# Patient Record
Sex: FEMALE | Race: OTHER | HISPANIC OR LATINO
[De-identification: names, ages, dates, MRNs, and addresses within clinical notes are randomized per-mention and may not be internally consistent; named-entity substitution may affect disease eponyms.]

---

## 2017-01-09 PROBLEM — Z00.00 ENCOUNTER FOR PREVENTIVE HEALTH EXAMINATION: Status: ACTIVE | Noted: 2017-01-09

## 2017-01-16 ENCOUNTER — APPOINTMENT (OUTPATIENT)
Dept: ANTEPARTUM | Facility: CLINIC | Age: 32
End: 2017-01-16

## 2017-03-21 ENCOUNTER — ASOB RESULT (OUTPATIENT)
Age: 32
End: 2017-03-21

## 2017-03-21 ENCOUNTER — APPOINTMENT (OUTPATIENT)
Dept: ANTEPARTUM | Facility: CLINIC | Age: 32
End: 2017-03-21

## 2017-03-21 ENCOUNTER — APPOINTMENT (OUTPATIENT)
Dept: MATERNAL FETAL MEDICINE | Facility: CLINIC | Age: 32
End: 2017-03-21

## 2017-03-21 VITALS
WEIGHT: 175 LBS | BODY MASS INDEX: 29.16 KG/M2 | HEIGHT: 65 IN | DIASTOLIC BLOOD PRESSURE: 60 MMHG | SYSTOLIC BLOOD PRESSURE: 100 MMHG

## 2017-03-21 DIAGNOSIS — Z20.828 OTHER SPECIFIED DISEASES AND CONDITIONS COMPLICATING PREGNANCY, CHILDBIRTH AND THE PUERPERIUM: ICD-10-CM

## 2017-03-21 DIAGNOSIS — Z3A.29 29 WEEKS GESTATION OF PREGNANCY: ICD-10-CM

## 2017-03-21 DIAGNOSIS — O40.3XX0 POLYHYDRAMNIOS, THIRD TRIMESTER, NOT APPLICABLE OR UNSPECIFIED: ICD-10-CM

## 2017-03-21 DIAGNOSIS — O09.893 SUPERVISION OF OTHER HIGH RISK PREGNANCIES, THIRD TRIMESTER: ICD-10-CM

## 2017-03-21 DIAGNOSIS — O99.89 OTHER SPECIFIED DISEASES AND CONDITIONS COMPLICATING PREGNANCY, CHILDBIRTH AND THE PUERPERIUM: ICD-10-CM

## 2017-03-21 RX ORDER — VITAMIN C, CALCIUM, IRON, VITAMIN D3, VITAMIN E, VITAMIN B1, VITAMIN B2, VITAMIN B3, VITAMIN B6, FOLIC ACID, IODINE, ZINC, COPPER, DOCUSATE SODIUM, DOCOSAHEXAENOIC ACID (DHA) 27-1-50 MG
27-1 & 250 KIT ORAL
Refills: 0 | Status: ACTIVE | COMMUNITY

## 2017-04-04 ENCOUNTER — ASOB RESULT (OUTPATIENT)
Age: 32
End: 2017-04-04

## 2017-04-04 ENCOUNTER — APPOINTMENT (OUTPATIENT)
Dept: ANTEPARTUM | Facility: CLINIC | Age: 32
End: 2017-04-04

## 2017-04-17 ENCOUNTER — ASOB RESULT (OUTPATIENT)
Age: 32
End: 2017-04-17

## 2017-04-17 ENCOUNTER — APPOINTMENT (OUTPATIENT)
Dept: ANTEPARTUM | Facility: CLINIC | Age: 32
End: 2017-04-17

## 2017-05-08 ENCOUNTER — ASOB RESULT (OUTPATIENT)
Age: 32
End: 2017-05-08

## 2017-05-08 ENCOUNTER — APPOINTMENT (OUTPATIENT)
Dept: ANTEPARTUM | Facility: CLINIC | Age: 32
End: 2017-05-08

## 2017-06-09 ENCOUNTER — ASOB RESULT (OUTPATIENT)
Age: 32
End: 2017-06-09

## 2017-06-09 ENCOUNTER — APPOINTMENT (OUTPATIENT)
Dept: ANTEPARTUM | Facility: CLINIC | Age: 32
End: 2017-06-09

## 2017-06-13 ENCOUNTER — INPATIENT (INPATIENT)
Facility: HOSPITAL | Age: 32
LOS: 2 days | Discharge: HOME | End: 2017-06-16
Attending: OBSTETRICS & GYNECOLOGY | Admitting: OBSTETRICS & GYNECOLOGY
Payer: COMMERCIAL

## 2017-06-13 VITALS — HEIGHT: 65 IN | WEIGHT: 180.78 LBS

## 2017-06-13 DIAGNOSIS — O26.893 OTHER SPECIFIED PREGNANCY RELATED CONDITIONS, THIRD TRIMESTER: ICD-10-CM

## 2017-06-13 LAB
BLD GP AB SCN SERPL QL: SIGNIFICANT CHANGE UP
EOSINOPHIL # BLD AUTO: 0.1 K/UL — SIGNIFICANT CHANGE UP (ref 0–0.5)
EOSINOPHIL NFR BLD AUTO: 1.6 % — SIGNIFICANT CHANGE UP (ref 0–6)
HCT VFR BLD CALC: 35.1 % — LOW (ref 37–47)
HGB BLD-MCNC: 11.8 G/DL — LOW (ref 12–16)
LYMPHOCYTES # BLD AUTO: 1.6 K/UL — SIGNIFICANT CHANGE UP (ref 1–4.8)
LYMPHOCYTES # BLD AUTO: 28.4 % — SIGNIFICANT CHANGE UP (ref 20–55)
MCHC RBC-ENTMCNC: 28 PG — SIGNIFICANT CHANGE UP (ref 27–31)
MCHC RBC-ENTMCNC: 33.6 G/DL — SIGNIFICANT CHANGE UP (ref 32–36)
MCV RBC AUTO: 83.2 FL — SIGNIFICANT CHANGE UP (ref 81–99)
MONOCYTES # BLD AUTO: 0.4 K/UL — SIGNIFICANT CHANGE UP (ref 0–0.8)
MONOCYTES NFR BLD AUTO: 7.5 % — SIGNIFICANT CHANGE UP (ref 3–10)
NEUTROPHILS # BLD AUTO: 3.6 K/UL — SIGNIFICANT CHANGE UP (ref 1.8–8)
NEUTROPHILS NFR BLD AUTO: 62 % — SIGNIFICANT CHANGE UP (ref 37–73)
PLATELET # BLD AUTO: 164 K/UL — SIGNIFICANT CHANGE UP (ref 150–400)
RBC # BLD: 4.22 M/UL — LOW (ref 4.4–5.2)
RBC # FLD: 14.9 % — SIGNIFICANT CHANGE UP (ref 11–15.6)
RPR SERPL-ACNC: SIGNIFICANT CHANGE UP
TYPE + AB SCN PNL BLD: SIGNIFICANT CHANGE UP
WBC # BLD: 5.7 K/UL — SIGNIFICANT CHANGE UP (ref 4.8–10.8)
WBC # FLD AUTO: 5.7 K/UL — SIGNIFICANT CHANGE UP (ref 4.8–10.8)

## 2017-06-13 RX ORDER — SODIUM CHLORIDE 9 MG/ML
1000 INJECTION, SOLUTION INTRAVENOUS
Qty: 0 | Refills: 0 | Status: DISCONTINUED | OUTPATIENT
Start: 2017-06-13 | End: 2017-06-14

## 2017-06-13 RX ORDER — PENICILLIN G POTASSIUM 5000000 [IU]/1
5 POWDER, FOR SOLUTION INTRAMUSCULAR; INTRAPLEURAL; INTRATHECAL; INTRAVENOUS ONCE
Qty: 0 | Refills: 0 | Status: COMPLETED | OUTPATIENT
Start: 2017-06-13 | End: 2017-06-13

## 2017-06-13 RX ORDER — OXYTOCIN 10 UNIT/ML
2 VIAL (ML) INJECTION
Qty: 30 | Refills: 0 | Status: DISCONTINUED | OUTPATIENT
Start: 2017-06-13 | End: 2017-06-16

## 2017-06-13 RX ORDER — PENICILLIN G POTASSIUM 5000000 [IU]/1
2.5 POWDER, FOR SOLUTION INTRAMUSCULAR; INTRAPLEURAL; INTRATHECAL; INTRAVENOUS EVERY 4 HOURS
Qty: 0 | Refills: 0 | Status: DISCONTINUED | OUTPATIENT
Start: 2017-06-13 | End: 2017-06-14

## 2017-06-13 RX ORDER — CITRIC ACID/SODIUM CITRATE 300-500 MG
15 SOLUTION, ORAL ORAL EVERY 4 HOURS
Qty: 0 | Refills: 0 | Status: DISCONTINUED | OUTPATIENT
Start: 2017-06-13 | End: 2017-06-13

## 2017-06-13 RX ORDER — PENICILLIN G POTASSIUM 5000000 [IU]/1
POWDER, FOR SOLUTION INTRAMUSCULAR; INTRAPLEURAL; INTRATHECAL; INTRAVENOUS
Qty: 0 | Refills: 0 | Status: DISCONTINUED | OUTPATIENT
Start: 2017-06-13 | End: 2017-06-14

## 2017-06-13 RX ORDER — CITRIC ACID/SODIUM CITRATE 300-500 MG
30 SOLUTION, ORAL ORAL ONCE
Qty: 0 | Refills: 0 | Status: DISCONTINUED | OUTPATIENT
Start: 2017-06-13 | End: 2017-06-16

## 2017-06-13 RX ORDER — SODIUM CHLORIDE 9 MG/ML
1000 INJECTION, SOLUTION INTRAVENOUS ONCE
Qty: 0 | Refills: 0 | Status: COMPLETED | OUTPATIENT
Start: 2017-06-13 | End: 2017-06-13

## 2017-06-13 RX ORDER — OXYTOCIN 10 UNIT/ML
333.33 VIAL (ML) INJECTION
Qty: 20 | Refills: 0 | Status: DISCONTINUED | OUTPATIENT
Start: 2017-06-13 | End: 2017-06-14

## 2017-06-13 RX ADMIN — Medication 2 MILLIUNIT(S)/MIN: at 20:56

## 2017-06-13 RX ADMIN — PENICILLIN G POTASSIUM 200 MILLION UNIT(S): 5000000 POWDER, FOR SOLUTION INTRAMUSCULAR; INTRAPLEURAL; INTRATHECAL; INTRAVENOUS at 09:30

## 2017-06-13 RX ADMIN — SODIUM CHLORIDE 2000 MILLILITER(S): 9 INJECTION, SOLUTION INTRAVENOUS at 09:31

## 2017-06-13 RX ADMIN — PENICILLIN G POTASSIUM 200 MILLION UNIT(S): 5000000 POWDER, FOR SOLUTION INTRAMUSCULAR; INTRAPLEURAL; INTRATHECAL; INTRAVENOUS at 21:54

## 2017-06-13 RX ADMIN — PENICILLIN G POTASSIUM 200 MILLION UNIT(S): 5000000 POWDER, FOR SOLUTION INTRAMUSCULAR; INTRAPLEURAL; INTRATHECAL; INTRAVENOUS at 18:03

## 2017-06-13 RX ADMIN — SODIUM CHLORIDE 125 MILLILITER(S): 9 INJECTION, SOLUTION INTRAVENOUS at 09:31

## 2017-06-13 RX ADMIN — PENICILLIN G POTASSIUM 200 MILLION UNIT(S): 5000000 POWDER, FOR SOLUTION INTRAMUSCULAR; INTRAPLEURAL; INTRATHECAL; INTRAVENOUS at 13:29

## 2017-06-14 LAB
BASE EXCESS BLDCOA CALC-SCNC: -9.4 MMOL/L — SIGNIFICANT CHANGE UP (ref -11.6–0.4)
BASE EXCESS BLDCOV CALC-SCNC: -7.5 MMOL/L — SIGNIFICANT CHANGE UP (ref -9.3–0.3)
BASOPHILS # BLD AUTO: 0 K/UL — SIGNIFICANT CHANGE UP (ref 0–0.2)
BASOPHILS NFR BLD AUTO: 0.1 % — SIGNIFICANT CHANGE UP (ref 0–2)
GAS PNL BLDCOV: 7.28 — SIGNIFICANT CHANGE UP (ref 7.11–7.36)
HCO3 BLDCOA-SCNC: 18 MMOL/L — SIGNIFICANT CHANGE UP (ref 15–27)
HCO3 BLDCOV-SCNC: 19 MMOL/L — SIGNIFICANT CHANGE UP (ref 17–25)
HCT VFR BLD CALC: 32.2 % — LOW (ref 37–47)
HGB BLD-MCNC: 11 G/DL — LOW (ref 12–16)
LYMPHOCYTES # BLD AUTO: 0.8 K/UL — LOW (ref 1–4.8)
LYMPHOCYTES # BLD AUTO: 5 % — LOW (ref 20–55)
MCHC RBC-ENTMCNC: 27.8 PG — SIGNIFICANT CHANGE UP (ref 27–31)
MCHC RBC-ENTMCNC: 34.2 G/DL — SIGNIFICANT CHANGE UP (ref 32–36)
MCV RBC AUTO: 81.5 FL — SIGNIFICANT CHANGE UP (ref 81–99)
MONOCYTES # BLD AUTO: 0.6 K/UL — SIGNIFICANT CHANGE UP (ref 0–0.8)
MONOCYTES NFR BLD AUTO: 3.5 % — SIGNIFICANT CHANGE UP (ref 3–10)
NEUTROPHILS # BLD AUTO: 14.4 K/UL — HIGH (ref 1.8–8)
NEUTROPHILS NFR BLD AUTO: 91.1 % — HIGH (ref 37–73)
PCO2 BLDCOA: 47.8 MMHG — SIGNIFICANT CHANGE UP (ref 32.2–65.8)
PCO2 BLDCOV: 40.9 MMHG — SIGNIFICANT CHANGE UP (ref 27–49.4)
PH BLDCOA: 7.21 — SIGNIFICANT CHANGE UP (ref 7.11–7.36)
PLATELET # BLD AUTO: 166 K/UL — SIGNIFICANT CHANGE UP (ref 150–400)
PO2 BLDCOA: 26 MMHG — SIGNIFICANT CHANGE UP (ref 6–30)
PO2 BLDCOA: 29.3 MMHG — SIGNIFICANT CHANGE UP (ref 17.4–41)
RBC # BLD: 3.95 M/UL — LOW (ref 4.4–5.2)
RBC # FLD: 14.9 % — SIGNIFICANT CHANGE UP (ref 11–15.6)
SAO2 % BLDCOA: SIGNIFICANT CHANGE UP
SAO2 % BLDCOV: SIGNIFICANT CHANGE UP
WBC # BLD: 15.8 K/UL — HIGH (ref 4.8–10.8)
WBC # FLD AUTO: 15.8 K/UL — HIGH (ref 4.8–10.8)

## 2017-06-14 RX ORDER — OXYTOCIN 10 UNIT/ML
10 VIAL (ML) INJECTION ONCE
Qty: 0 | Refills: 0 | Status: COMPLETED | OUTPATIENT
Start: 2017-06-14 | End: 2017-06-14

## 2017-06-14 RX ORDER — OXYTOCIN 10 UNIT/ML
41.67 VIAL (ML) INJECTION
Qty: 20 | Refills: 0 | Status: DISCONTINUED | OUTPATIENT
Start: 2017-06-14 | End: 2017-06-16

## 2017-06-14 RX ORDER — AER TRAVELER 0.5 G/1
1 SOLUTION RECTAL; TOPICAL EVERY 4 HOURS
Qty: 0 | Refills: 0 | Status: DISCONTINUED | OUTPATIENT
Start: 2017-06-14 | End: 2017-06-16

## 2017-06-14 RX ORDER — ACETAMINOPHEN 500 MG
650 TABLET ORAL EVERY 6 HOURS
Qty: 0 | Refills: 0 | Status: DISCONTINUED | OUTPATIENT
Start: 2017-06-14 | End: 2017-06-16

## 2017-06-14 RX ORDER — TETANUS TOXOID, REDUCED DIPHTHERIA TOXOID AND ACELLULAR PERTUSSIS VACCINE, ADSORBED 5; 2.5; 8; 8; 2.5 [IU]/.5ML; [IU]/.5ML; UG/.5ML; UG/.5ML; UG/.5ML
0.5 SUSPENSION INTRAMUSCULAR ONCE
Qty: 0 | Refills: 0 | Status: DISCONTINUED | OUTPATIENT
Start: 2017-06-14 | End: 2017-06-16

## 2017-06-14 RX ORDER — DIPHENHYDRAMINE HCL 50 MG
25 CAPSULE ORAL EVERY 6 HOURS
Qty: 0 | Refills: 0 | Status: DISCONTINUED | OUTPATIENT
Start: 2017-06-14 | End: 2017-06-16

## 2017-06-14 RX ORDER — GLYCERIN ADULT
1 SUPPOSITORY, RECTAL RECTAL AT BEDTIME
Qty: 0 | Refills: 0 | Status: DISCONTINUED | OUTPATIENT
Start: 2017-06-14 | End: 2017-06-16

## 2017-06-14 RX ORDER — DIBUCAINE 1 %
1 OINTMENT (GRAM) RECTAL EVERY 4 HOURS
Qty: 0 | Refills: 0 | Status: DISCONTINUED | OUTPATIENT
Start: 2017-06-14 | End: 2017-06-16

## 2017-06-14 RX ORDER — DOCUSATE SODIUM 100 MG
100 CAPSULE ORAL
Qty: 0 | Refills: 0 | Status: DISCONTINUED | OUTPATIENT
Start: 2017-06-14 | End: 2017-06-16

## 2017-06-14 RX ORDER — HYDROCORTISONE 1 %
1 OINTMENT (GRAM) TOPICAL EVERY 4 HOURS
Qty: 0 | Refills: 0 | Status: DISCONTINUED | OUTPATIENT
Start: 2017-06-14 | End: 2017-06-16

## 2017-06-14 RX ORDER — SIMETHICONE 80 MG/1
80 TABLET, CHEWABLE ORAL EVERY 6 HOURS
Qty: 0 | Refills: 0 | Status: DISCONTINUED | OUTPATIENT
Start: 2017-06-14 | End: 2017-06-16

## 2017-06-14 RX ORDER — LANOLIN
1 OINTMENT (GRAM) TOPICAL EVERY 6 HOURS
Qty: 0 | Refills: 0 | Status: DISCONTINUED | OUTPATIENT
Start: 2017-06-14 | End: 2017-06-16

## 2017-06-14 RX ORDER — IBUPROFEN 200 MG
600 TABLET ORAL EVERY 6 HOURS
Qty: 0 | Refills: 0 | Status: DISCONTINUED | OUTPATIENT
Start: 2017-06-14 | End: 2017-06-16

## 2017-06-14 RX ORDER — PRAMOXINE HYDROCHLORIDE 150 MG/15G
1 AEROSOL, FOAM RECTAL EVERY 4 HOURS
Qty: 0 | Refills: 0 | Status: DISCONTINUED | OUTPATIENT
Start: 2017-06-14 | End: 2017-06-16

## 2017-06-14 RX ORDER — SODIUM CHLORIDE 9 MG/ML
3 INJECTION INTRAMUSCULAR; INTRAVENOUS; SUBCUTANEOUS EVERY 8 HOURS
Qty: 0 | Refills: 0 | Status: DISCONTINUED | OUTPATIENT
Start: 2017-06-14 | End: 2017-06-16

## 2017-06-14 RX ORDER — OXYTOCIN 10 UNIT/ML
125 VIAL (ML) INJECTION
Qty: 20 | Refills: 0 | Status: DISCONTINUED | OUTPATIENT
Start: 2017-06-14 | End: 2017-06-14

## 2017-06-14 RX ORDER — BUTORPHANOL TARTRATE 2 MG/ML
2 INJECTION, SOLUTION INTRAMUSCULAR; INTRAVENOUS ONCE
Qty: 0 | Refills: 0 | Status: DISCONTINUED | OUTPATIENT
Start: 2017-06-14 | End: 2017-06-14

## 2017-06-14 RX ORDER — MAGNESIUM HYDROXIDE 400 MG/1
30 TABLET, CHEWABLE ORAL
Qty: 0 | Refills: 0 | Status: DISCONTINUED | OUTPATIENT
Start: 2017-06-14 | End: 2017-06-16

## 2017-06-14 RX ORDER — OXYTOCIN 10 UNIT/ML
41.67 VIAL (ML) INJECTION
Qty: 20 | Refills: 0 | Status: DISCONTINUED | OUTPATIENT
Start: 2017-06-14 | End: 2017-06-14

## 2017-06-14 RX ORDER — SODIUM CHLORIDE 9 MG/ML
1000 INJECTION, SOLUTION INTRAVENOUS ONCE
Qty: 0 | Refills: 0 | Status: COMPLETED | OUTPATIENT
Start: 2017-06-14 | End: 2017-06-14

## 2017-06-14 RX ADMIN — SODIUM CHLORIDE 3 MILLILITER(S): 9 INJECTION INTRAMUSCULAR; INTRAVENOUS; SUBCUTANEOUS at 22:30

## 2017-06-14 RX ADMIN — Medication 202 MILLIGRAM(S): at 03:30

## 2017-06-14 RX ADMIN — PENICILLIN G POTASSIUM 200 MILLION UNIT(S): 5000000 POWDER, FOR SOLUTION INTRAMUSCULAR; INTRAPLEURAL; INTRATHECAL; INTRAVENOUS at 02:00

## 2017-06-14 RX ADMIN — PENICILLIN G POTASSIUM 200 MILLION UNIT(S): 5000000 POWDER, FOR SOLUTION INTRAMUSCULAR; INTRAPLEURAL; INTRATHECAL; INTRAVENOUS at 05:46

## 2017-06-14 RX ADMIN — Medication 125 MILLIUNIT(S)/MIN: at 10:45

## 2017-06-14 RX ADMIN — Medication 0.2 MILLIGRAM(S): at 07:41

## 2017-06-14 RX ADMIN — SODIUM CHLORIDE 2000 MILLILITER(S): 9 INJECTION, SOLUTION INTRAVENOUS at 09:20

## 2017-06-14 RX ADMIN — Medication 1 TABLET(S): at 11:53

## 2017-06-14 RX ADMIN — BUTORPHANOL TARTRATE 2 MILLIGRAM(S): 2 INJECTION, SOLUTION INTRAMUSCULAR; INTRAVENOUS at 03:51

## 2017-06-14 RX ADMIN — Medication 10 UNIT(S): at 09:14

## 2017-06-14 RX ADMIN — Medication 125 MILLIUNIT(S)/MIN: at 08:22

## 2017-06-14 RX ADMIN — BUTORPHANOL TARTRATE 2 MILLIGRAM(S): 2 INJECTION, SOLUTION INTRAMUSCULAR; INTRAVENOUS at 03:36

## 2017-06-15 RX ADMIN — SODIUM CHLORIDE 3 MILLILITER(S): 9 INJECTION INTRAMUSCULAR; INTRAVENOUS; SUBCUTANEOUS at 06:52

## 2017-06-15 RX ADMIN — Medication 1 TABLET(S): at 12:32

## 2017-06-15 RX ADMIN — Medication 600 MILLIGRAM(S): at 20:28

## 2017-06-15 RX ADMIN — Medication 600 MILLIGRAM(S): at 21:15

## 2017-06-15 RX ADMIN — Medication 600 MILLIGRAM(S): at 12:00

## 2017-06-15 RX ADMIN — Medication 600 MILLIGRAM(S): at 11:01

## 2017-06-15 NOTE — PROGRESS NOTE ADULT - SUBJECTIVE AND OBJECTIVE BOX
This is a 32 yo now  s/p  @ 40.1 PPD #1. Pt seen and examined at bedside. Pt is ambulating, tolerating PO, + flatus, No BM yet. Pt is bottle-feeding. Pain is controlled on medications, having minimal vaginal bleeding.    Vital Signs Last 24 Hrs  T(C): 37.2, Max: 37.6 (06-14 @ 13:50)  T(F): 99, Max: 99.7 (06-14 @ 13:50)  HR: 76 (73 - 97)  BP: 103/65 (92/50 - 129/86)  BP(mean): 102 (64 - 102)  RR: 18 (16 - 18)  SpO2: 100% (99% - 100%)    General: NAD  HEENT: NCAT  Respiratory: CTABL  CV: S1S2 with RRR  Abdominal: Soft, +BS x 4 quadrants, uterine fundus firm 1 cm below umbilicus   Pelvic:  minimal lochia  Extremities: No edema b/l; no calf tenderness                          11.0   15.8  )-----------( 166      ( 2017 13:06 )             32.2     MEDICATIONS  (STANDING):  citric acid/sodium citrate Solution 30milliLiter(s) Oral once  oxytocin Infusion 2milliUNIT(s)/Min IV Continuous <Continuous>  sodium chloride 0.9% lock flush 3milliLiter(s) IV Push every 8 hours  diphtheria/tetanus/pertussis (acellular) Vaccine (ADAcel) 0.5milliLiter(s) IntraMuscular once  prenatal multivitamin 1Tablet(s) Oral daily  sodium chloride 0.9% lock flush 3milliLiter(s) IV Push every 8 hours  oxytocin Infusion 41.667milliUNIT(s)/Min IV Continuous <Continuous>    MEDICATIONS  (PRN):  acetaminophen   Tablet. 650milliGRAM(s) Oral every 6 hours PRN Mild Pain  acetaminophen   Tablet 650milliGRAM(s) Oral every 6 hours PRN For Temp greater than 38.5 C (101.3 F)  ibuprofen  Tablet 600milliGRAM(s) Oral every 6 hours PRN Moderate Pain  oxyCODONE  5 mG/acetaminophen 325 mG 2Tablet(s) Oral every 6 hours PRN Severe Pain  hydrocortisone 1% Cream 1Application(s) Topical every 4 hours PRN Moderate to Severe Perineal Pain  pramoxine 1%/zinc 5% Cream 1Application(s) Topical every 4 hours PRN Moderate to Severe Perineal Pain  dibucaine 1% Ointment 1Application(s) Topical every 4 hours PRN Perineal Discomfort  lanolin Ointment 1Application(s) Topical every 6 hours PRN Sore Nipples  witch hazel Pads 1Application(s) Topical every 4 hours PRN Perineal Discomfort  simethicone 80milliGRAM(s) Chew every 6 hours PRN Gas  diphenhydrAMINE   Capsule 25milliGRAM(s) Oral every 6 hours PRN Itching  glycerin Suppository - Adult 1Suppository(s) Rectal at bedtime PRN Constipation  magnesium hydroxide Suspension 30milliLiter(s) Oral two times a day PRN Constipation  docusate sodium 100milliGRAM(s) Oral two times a day PRN Stool Softening

## 2017-06-16 VITALS
DIASTOLIC BLOOD PRESSURE: 76 MMHG | TEMPERATURE: 98 F | RESPIRATION RATE: 18 BRPM | HEART RATE: 100 BPM | SYSTOLIC BLOOD PRESSURE: 120 MMHG

## 2017-06-16 RX ADMIN — Medication 600 MILLIGRAM(S): at 09:35

## 2017-06-16 RX ADMIN — Medication 1 TABLET(S): at 12:54

## 2017-06-16 NOTE — PROGRESS NOTE ADULT - SUBJECTIVE AND OBJECTIVE BOX
This is a 30 yo now  s/p  @ 40.1 PPD #2. Pt seen and examined at bedside. Pt is ambulating, tolerating PO, + flatus, +BMx1. Pt is bottle-feeding. Pain is controlled on medications, having minimal vaginal bleeding.    Vital Signs Last 24 Hrs  T(C): 36.6, Max: 36.7 (06-15 @ 08:04)  T(F): 97.9, Max: 98 (06-15 @ 08:04)  HR: 90 (72 - 90)  BP: 115/65 (109/77 - 115/65)  BP(mean): --  RR: 18 (18 - 18)  SpO2: --RA      General: NAD  HEENT: NCAT  Respiratory: CTABL  CV: S1S2 with RRR  Abdominal: Soft, +BS x 4 quadrants, uterine fundus firm 1 cm below umbilicus   Pelvic:  minimal lochia  Extremities: No edema b/l; no calf tenderness                          11.0   15.8  )-----------( 166      ( 2017 13:06 )             32.2     MEDICATIONS  (STANDING):  citric acid/sodium citrate Solution 30milliLiter(s) Oral once  oxytocin Infusion 2milliUNIT(s)/Min IV Continuous <Continuous>  sodium chloride 0.9% lock flush 3milliLiter(s) IV Push every 8 hours  diphtheria/tetanus/pertussis (acellular) Vaccine (ADAcel) 0.5milliLiter(s) IntraMuscular once  prenatal multivitamin 1Tablet(s) Oral daily  sodium chloride 0.9% lock flush 3milliLiter(s) IV Push every 8 hours  oxytocin Infusion 41.667milliUNIT(s)/Min IV Continuous <Continuous>    MEDICATIONS  (PRN):  acetaminophen   Tablet. 650milliGRAM(s) Oral every 6 hours PRN Mild Pain  acetaminophen   Tablet 650milliGRAM(s) Oral every 6 hours PRN For Temp greater than 38.5 C (101.3 F)  ibuprofen  Tablet 600milliGRAM(s) Oral every 6 hours PRN Moderate Pain  oxyCODONE  5 mG/acetaminophen 325 mG 2Tablet(s) Oral every 6 hours PRN Severe Pain  hydrocortisone 1% Cream 1Application(s) Topical every 4 hours PRN Moderate to Severe Perineal Pain  pramoxine 1%/zinc 5% Cream 1Application(s) Topical every 4 hours PRN Moderate to Severe Perineal Pain  dibucaine 1% Ointment 1Application(s) Topical every 4 hours PRN Perineal Discomfort  lanolin Ointment 1Application(s) Topical every 6 hours PRN Sore Nipples  witch hazel Pads 1Application(s) Topical every 4 hours PRN Perineal Discomfort  simethicone 80milliGRAM(s) Chew every 6 hours PRN Gas  diphenhydrAMINE   Capsule 25milliGRAM(s) Oral every 6 hours PRN Itching  glycerin Suppository - Adult 1Suppository(s) Rectal at bedtime PRN Constipation  magnesium hydroxide Suspension 30milliLiter(s) Oral two times a day PRN Constipation  docusate sodium 100milliGRAM(s) Oral two times a day PRN Stool Softening

## 2017-06-16 NOTE — DISCHARGE NOTE OB - CARE PROVIDER_API CALL
[facility],   [facility]  [address] [place] [address]  Phone: [phone]  Fax: (   )    -

## 2017-06-16 NOTE — DISCHARGE NOTE OB - PLAN OF CARE
delivered Continue with diet and activity as tolerated, medications as prescribed. Follow up at [facility] in 6 weeks for postpartum care.

## 2017-06-16 NOTE — DISCHARGE NOTE OB - CARE PLAN
Principal Discharge DX:	Vaginal delivery  Goal:	delivered  Instructions for follow-up, activity and diet:	Continue with diet and activity as tolerated, medications as prescribed. Follow up at [facility] in 6 weeks for postpartum care. Principal Discharge DX:	Vaginal delivery  Goal:	delivered  Instructions for follow-up, activity and diet:	Continue with diet and activity as tolerated, medications as prescribed. Follow up at [facility] in 6 weeks for postpartum care.

## 2017-06-16 NOTE — DISCHARGE NOTE OB - HOSPITAL COURSE
This is a 30 yo now  who experienced  @ 40.1. Labor course was uncomplicated, delivery was uncomplicated. Postpartum course was unremarkable. Patient was transferred to postpartum floor and monitored. Patient is medically optimized for discharge, instructed to follow up with [facility] in 6 weeks for postpartum care.

## 2017-06-16 NOTE — DISCHARGE NOTE OB - PROVIDER TOKENS
FREE:[LAST:[[facility]],PHONE:[phone]],FAX:[(   )    -],ADDRESS:[[place] [address]]]

## 2017-06-16 NOTE — PROGRESS NOTE ADULT - PROBLEM SELECTOR PLAN 1
Continue current management  Encourage PO intake and hydration  Encourage mother/baby interaction and breastfeeding  Encourage ambulation  Plan for dc today pending attending eval
Continue current management  Encourage PO intake and hydration  Encourage mother/baby interaction and breastfeeding  Encourage ambulation

## 2017-07-23 PROCEDURE — 82803 BLOOD GASES ANY COMBINATION: CPT

## 2017-07-23 PROCEDURE — T1013: CPT

## 2017-07-23 PROCEDURE — 86901 BLOOD TYPING SEROLOGIC RH(D): CPT

## 2017-07-23 PROCEDURE — 86592 SYPHILIS TEST NON-TREP QUAL: CPT

## 2017-07-23 PROCEDURE — 59050 FETAL MONITOR W/REPORT: CPT

## 2017-07-23 PROCEDURE — 85027 COMPLETE CBC AUTOMATED: CPT

## 2017-07-23 PROCEDURE — 96372 THER/PROPH/DIAG INJ SC/IM: CPT

## 2017-07-23 PROCEDURE — 86900 BLOOD TYPING SEROLOGIC ABO: CPT

## 2017-07-23 PROCEDURE — 86850 RBC ANTIBODY SCREEN: CPT

## 2018-06-29 ENCOUNTER — APPOINTMENT (OUTPATIENT)
Dept: ANTEPARTUM | Facility: CLINIC | Age: 33
End: 2018-06-29
Payer: MEDICAID

## 2018-06-29 ENCOUNTER — ASOB RESULT (OUTPATIENT)
Age: 33
End: 2018-06-29

## 2018-06-29 PROCEDURE — 76811 OB US DETAILED SNGL FETUS: CPT

## 2018-06-29 PROCEDURE — 76817 TRANSVAGINAL US OBSTETRIC: CPT

## 2018-07-19 ENCOUNTER — APPOINTMENT (OUTPATIENT)
Dept: ANTEPARTUM | Facility: CLINIC | Age: 33
End: 2018-07-19

## 2018-08-24 ENCOUNTER — APPOINTMENT (OUTPATIENT)
Dept: ANTEPARTUM | Facility: CLINIC | Age: 33
End: 2018-08-24
Payer: MEDICAID

## 2018-08-24 ENCOUNTER — ASOB RESULT (OUTPATIENT)
Age: 33
End: 2018-08-24

## 2018-08-24 PROCEDURE — 76816 OB US FOLLOW-UP PER FETUS: CPT

## 2018-08-24 PROCEDURE — 76819 FETAL BIOPHYS PROFIL W/O NST: CPT

## 2018-09-21 ENCOUNTER — APPOINTMENT (OUTPATIENT)
Dept: ANTEPARTUM | Facility: CLINIC | Age: 33
End: 2018-09-21
Payer: MEDICAID

## 2018-09-21 ENCOUNTER — ASOB RESULT (OUTPATIENT)
Age: 33
End: 2018-09-21

## 2018-09-21 PROCEDURE — 76819 FETAL BIOPHYS PROFIL W/O NST: CPT

## 2018-09-21 PROCEDURE — 76816 OB US FOLLOW-UP PER FETUS: CPT

## 2018-10-04 ENCOUNTER — INPATIENT (INPATIENT)
Facility: HOSPITAL | Age: 33
LOS: 1 days | Discharge: HOME | End: 2018-10-06
Attending: OBSTETRICS & GYNECOLOGY | Admitting: OBSTETRICS & GYNECOLOGY
Payer: MEDICAID

## 2018-10-04 VITALS
SYSTOLIC BLOOD PRESSURE: 129 MMHG | RESPIRATION RATE: 18 BRPM | TEMPERATURE: 98 F | HEART RATE: 67 BPM | DIASTOLIC BLOOD PRESSURE: 86 MMHG

## 2018-10-04 DIAGNOSIS — O47.1 FALSE LABOR AT OR AFTER 37 COMPLETED WEEKS OF GESTATION: ICD-10-CM

## 2018-10-04 LAB
ALBUMIN SERPL ELPH-MCNC: 3.3 G/DL — SIGNIFICANT CHANGE UP (ref 3.3–5.2)
ALLERGY+IMMUNOLOGY DIAG STUDY NOTE: SIGNIFICANT CHANGE UP
ALP SERPL-CCNC: 163 U/L — HIGH (ref 40–120)
ALT FLD-CCNC: 13 U/L — SIGNIFICANT CHANGE UP
ANION GAP SERPL CALC-SCNC: 16 MMOL/L — SIGNIFICANT CHANGE UP (ref 5–17)
APPEARANCE UR: CLEAR — SIGNIFICANT CHANGE UP
APPEARANCE UR: CLEAR — SIGNIFICANT CHANGE UP
AST SERPL-CCNC: 21 U/L — SIGNIFICANT CHANGE UP
BACTERIA # UR AUTO: ABNORMAL
BACTERIA # UR AUTO: ABNORMAL
BASE EXCESS BLDCOV CALC-SCNC: -5.3 MMOL/L — LOW (ref -2–2)
BASOPHILS # BLD AUTO: 0 K/UL — SIGNIFICANT CHANGE UP (ref 0–0.2)
BASOPHILS NFR BLD AUTO: 0.1 % — SIGNIFICANT CHANGE UP (ref 0–2)
BILIRUB SERPL-MCNC: 0.6 MG/DL — SIGNIFICANT CHANGE UP (ref 0.4–2)
BILIRUB UR-MCNC: NEGATIVE — SIGNIFICANT CHANGE UP
BILIRUB UR-MCNC: NEGATIVE — SIGNIFICANT CHANGE UP
BLD GP AB SCN SERPL QL: ABNORMAL
BUN SERPL-MCNC: 8 MG/DL — SIGNIFICANT CHANGE UP (ref 8–20)
CALCIUM SERPL-MCNC: 9 MG/DL — SIGNIFICANT CHANGE UP (ref 8.6–10.2)
CHLORIDE SERPL-SCNC: 106 MMOL/L — SIGNIFICANT CHANGE UP (ref 98–107)
CO2 SERPL-SCNC: 16 MMOL/L — LOW (ref 22–29)
COLOR SPEC: YELLOW — SIGNIFICANT CHANGE UP
COLOR SPEC: YELLOW — SIGNIFICANT CHANGE UP
CREAT ?TM UR-MCNC: 166 MG/DL — SIGNIFICANT CHANGE UP
CREAT SERPL-MCNC: 0.56 MG/DL — SIGNIFICANT CHANGE UP (ref 0.5–1.3)
DIFF PNL FLD: ABNORMAL
DIFF PNL FLD: ABNORMAL
DIR ANTIGLOB POLYSPECIFIC INTERPRETATION: SIGNIFICANT CHANGE UP
EOSINOPHIL # BLD AUTO: 0 K/UL — SIGNIFICANT CHANGE UP (ref 0–0.5)
EOSINOPHIL NFR BLD AUTO: 0.1 % — SIGNIFICANT CHANGE UP (ref 0–6)
EPI CELLS # UR: SIGNIFICANT CHANGE UP
EPI CELLS # UR: SIGNIFICANT CHANGE UP
GAS PNL BLDCOV: 7.38 — SIGNIFICANT CHANGE UP (ref 7.25–7.45)
GLUCOSE SERPL-MCNC: 78 MG/DL — SIGNIFICANT CHANGE UP (ref 70–115)
GLUCOSE UR QL: NEGATIVE MG/DL — SIGNIFICANT CHANGE UP
GLUCOSE UR QL: NEGATIVE MG/DL — SIGNIFICANT CHANGE UP
HCO3 BLDCOV-SCNC: 20 MMOL/L — LOW (ref 21–29)
HCT VFR BLD CALC: 35 % — LOW (ref 37–47)
HCT VFR BLD CALC: 38.8 % — SIGNIFICANT CHANGE UP (ref 37–47)
HGB BLD-MCNC: 12 G/DL — SIGNIFICANT CHANGE UP (ref 12–16)
HGB BLD-MCNC: 13.3 G/DL — SIGNIFICANT CHANGE UP (ref 12–16)
KETONES UR-MCNC: ABNORMAL
KETONES UR-MCNC: ABNORMAL
LEUKOCYTE ESTERASE UR-ACNC: ABNORMAL
LEUKOCYTE ESTERASE UR-ACNC: ABNORMAL
LYMPHOCYTES # BLD AUTO: 1.2 K/UL — SIGNIFICANT CHANGE UP (ref 1–4.8)
LYMPHOCYTES # BLD AUTO: 15.6 % — LOW (ref 20–55)
MCHC RBC-ENTMCNC: 27.9 PG — SIGNIFICANT CHANGE UP (ref 27–31)
MCHC RBC-ENTMCNC: 28 PG — SIGNIFICANT CHANGE UP (ref 27–31)
MCHC RBC-ENTMCNC: 34.3 G/DL — SIGNIFICANT CHANGE UP (ref 32–36)
MCHC RBC-ENTMCNC: 34.3 G/DL — SIGNIFICANT CHANGE UP (ref 32–36)
MCV RBC AUTO: 81.5 FL — SIGNIFICANT CHANGE UP (ref 81–99)
MCV RBC AUTO: 81.8 FL — SIGNIFICANT CHANGE UP (ref 81–99)
MONOCYTES # BLD AUTO: 0.4 K/UL — SIGNIFICANT CHANGE UP (ref 0–0.8)
MONOCYTES NFR BLD AUTO: 5 % — SIGNIFICANT CHANGE UP (ref 3–10)
NEUTROPHILS # BLD AUTO: 6.1 K/UL — SIGNIFICANT CHANGE UP (ref 1.8–8)
NEUTROPHILS NFR BLD AUTO: 78.8 % — HIGH (ref 37–73)
NITRITE UR-MCNC: NEGATIVE — SIGNIFICANT CHANGE UP
NITRITE UR-MCNC: NEGATIVE — SIGNIFICANT CHANGE UP
PCO2 BLDCOV: 31.2 MMHG — SIGNIFICANT CHANGE UP (ref 29–53)
PH UR: 6 — SIGNIFICANT CHANGE UP (ref 5–8)
PH UR: 6.5 — SIGNIFICANT CHANGE UP (ref 5–8)
PLATELET # BLD AUTO: 122 K/UL — LOW (ref 150–400)
PLATELET # BLD AUTO: 131 K/UL — LOW (ref 150–400)
PO2 BLDCOA: 31.7 MMHG — SIGNIFICANT CHANGE UP (ref 17–41)
POTASSIUM SERPL-MCNC: 4 MMOL/L — SIGNIFICANT CHANGE UP (ref 3.5–5.3)
POTASSIUM SERPL-SCNC: 4 MMOL/L — SIGNIFICANT CHANGE UP (ref 3.5–5.3)
PROT ?TM UR-MCNC: 64 MG/DL — HIGH (ref 0–12)
PROT SERPL-MCNC: 6.5 G/DL — LOW (ref 6.6–8.7)
PROT UR-MCNC: 100 MG/DL
PROT UR-MCNC: 30 MG/DL
PROT/CREAT UR-RTO: 0.4 RATIO — HIGH
RBC # BLD: 4.28 M/UL — LOW (ref 4.4–5.2)
RBC # BLD: 4.76 M/UL — SIGNIFICANT CHANGE UP (ref 4.4–5.2)
RBC # FLD: 14.2 % — SIGNIFICANT CHANGE UP (ref 11–15.6)
RBC # FLD: 14.4 % — SIGNIFICANT CHANGE UP (ref 11–15.6)
RBC CASTS # UR COMP ASSIST: ABNORMAL /HPF (ref 0–4)
RBC CASTS # UR COMP ASSIST: SIGNIFICANT CHANGE UP /HPF (ref 0–4)
SAO2 % BLDCOV: SIGNIFICANT CHANGE UP
SODIUM SERPL-SCNC: 138 MMOL/L — SIGNIFICANT CHANGE UP (ref 135–145)
SP GR SPEC: 1.01 — SIGNIFICANT CHANGE UP (ref 1.01–1.02)
SP GR SPEC: 1.01 — SIGNIFICANT CHANGE UP (ref 1.01–1.02)
TYPE + AB SCN PNL BLD: SIGNIFICANT CHANGE UP
UROBILINOGEN FLD QL: NEGATIVE MG/DL — SIGNIFICANT CHANGE UP
UROBILINOGEN FLD QL: NEGATIVE MG/DL — SIGNIFICANT CHANGE UP
WBC # BLD: 7.7 K/UL — SIGNIFICANT CHANGE UP (ref 4.8–10.8)
WBC # BLD: 8.3 K/UL — SIGNIFICANT CHANGE UP (ref 4.8–10.8)
WBC # FLD AUTO: 7.7 K/UL — SIGNIFICANT CHANGE UP (ref 4.8–10.8)
WBC # FLD AUTO: 8.3 K/UL — SIGNIFICANT CHANGE UP (ref 4.8–10.8)
WBC UR QL: SIGNIFICANT CHANGE UP
WBC UR QL: SIGNIFICANT CHANGE UP

## 2018-10-04 PROCEDURE — 86077 PHYS BLOOD BANK SERV XMATCH: CPT

## 2018-10-04 RX ORDER — SODIUM CHLORIDE 9 MG/ML
1000 INJECTION, SOLUTION INTRAVENOUS
Qty: 0 | Refills: 0 | Status: DISCONTINUED | OUTPATIENT
Start: 2018-10-04 | End: 2018-10-04

## 2018-10-04 RX ORDER — CARBOPROST TROMETHAMINE 250 UG/ML
250 INJECTION, SOLUTION INTRAMUSCULAR ONCE
Qty: 0 | Refills: 0 | Status: COMPLETED | OUTPATIENT
Start: 2018-10-04 | End: 2018-10-04

## 2018-10-04 RX ORDER — GLYCERIN ADULT
1 SUPPOSITORY, RECTAL RECTAL AT BEDTIME
Qty: 0 | Refills: 0 | Status: DISCONTINUED | OUTPATIENT
Start: 2018-10-04 | End: 2018-10-06

## 2018-10-04 RX ORDER — OXYTOCIN 10 UNIT/ML
333.33 VIAL (ML) INJECTION
Qty: 20 | Refills: 0 | Status: DISCONTINUED | OUTPATIENT
Start: 2018-10-04 | End: 2018-10-06

## 2018-10-04 RX ORDER — DIBUCAINE 1 %
1 OINTMENT (GRAM) RECTAL EVERY 4 HOURS
Qty: 0 | Refills: 0 | Status: DISCONTINUED | OUTPATIENT
Start: 2018-10-04 | End: 2018-10-06

## 2018-10-04 RX ORDER — SODIUM CHLORIDE 9 MG/ML
1000 INJECTION, SOLUTION INTRAVENOUS ONCE
Qty: 0 | Refills: 0 | Status: COMPLETED | OUTPATIENT
Start: 2018-10-04 | End: 2018-10-04

## 2018-10-04 RX ORDER — ACETAMINOPHEN 500 MG
1000 TABLET ORAL ONCE
Qty: 0 | Refills: 0 | Status: COMPLETED | OUTPATIENT
Start: 2018-10-04 | End: 2018-10-04

## 2018-10-04 RX ORDER — DIPHENHYDRAMINE HCL 50 MG
25 CAPSULE ORAL EVERY 6 HOURS
Qty: 0 | Refills: 0 | Status: DISCONTINUED | OUTPATIENT
Start: 2018-10-04 | End: 2018-10-06

## 2018-10-04 RX ORDER — SODIUM CHLORIDE 9 MG/ML
3 INJECTION INTRAMUSCULAR; INTRAVENOUS; SUBCUTANEOUS EVERY 8 HOURS
Qty: 0 | Refills: 0 | Status: DISCONTINUED | OUTPATIENT
Start: 2018-10-04 | End: 2018-10-06

## 2018-10-04 RX ORDER — LANOLIN
1 OINTMENT (GRAM) TOPICAL EVERY 6 HOURS
Qty: 0 | Refills: 0 | Status: DISCONTINUED | OUTPATIENT
Start: 2018-10-04 | End: 2018-10-06

## 2018-10-04 RX ORDER — CITRIC ACID/SODIUM CITRATE 300-500 MG
30 SOLUTION, ORAL ORAL ONCE
Qty: 0 | Refills: 0 | Status: DISCONTINUED | OUTPATIENT
Start: 2018-10-04 | End: 2018-10-04

## 2018-10-04 RX ORDER — OXYTOCIN 10 UNIT/ML
333.33 VIAL (ML) INJECTION
Qty: 20 | Refills: 0 | Status: COMPLETED | OUTPATIENT
Start: 2018-10-04

## 2018-10-04 RX ORDER — MAGNESIUM HYDROXIDE 400 MG/1
30 TABLET, CHEWABLE ORAL
Qty: 0 | Refills: 0 | Status: DISCONTINUED | OUTPATIENT
Start: 2018-10-04 | End: 2018-10-06

## 2018-10-04 RX ORDER — HYDROCORTISONE 1 %
1 OINTMENT (GRAM) TOPICAL EVERY 4 HOURS
Qty: 0 | Refills: 0 | Status: DISCONTINUED | OUTPATIENT
Start: 2018-10-04 | End: 2018-10-06

## 2018-10-04 RX ORDER — SIMETHICONE 80 MG/1
80 TABLET, CHEWABLE ORAL EVERY 6 HOURS
Qty: 0 | Refills: 0 | Status: DISCONTINUED | OUTPATIENT
Start: 2018-10-04 | End: 2018-10-06

## 2018-10-04 RX ORDER — DOCUSATE SODIUM 100 MG
100 CAPSULE ORAL
Qty: 0 | Refills: 0 | Status: DISCONTINUED | OUTPATIENT
Start: 2018-10-04 | End: 2018-10-06

## 2018-10-04 RX ORDER — TETANUS TOXOID, REDUCED DIPHTHERIA TOXOID AND ACELLULAR PERTUSSIS VACCINE, ADSORBED 5; 2.5; 8; 8; 2.5 [IU]/.5ML; [IU]/.5ML; UG/.5ML; UG/.5ML; UG/.5ML
0.5 SUSPENSION INTRAMUSCULAR ONCE
Qty: 0 | Refills: 0 | Status: DISCONTINUED | OUTPATIENT
Start: 2018-10-04 | End: 2018-10-06

## 2018-10-04 RX ORDER — OXYTOCIN 10 UNIT/ML
41.67 VIAL (ML) INJECTION
Qty: 20 | Refills: 0 | Status: DISCONTINUED | OUTPATIENT
Start: 2018-10-04 | End: 2018-10-06

## 2018-10-04 RX ORDER — OXYCODONE AND ACETAMINOPHEN 5; 325 MG/1; MG/1
2 TABLET ORAL EVERY 6 HOURS
Qty: 0 | Refills: 0 | Status: DISCONTINUED | OUTPATIENT
Start: 2018-10-04 | End: 2018-10-06

## 2018-10-04 RX ORDER — ACETAMINOPHEN 500 MG
650 TABLET ORAL EVERY 6 HOURS
Qty: 0 | Refills: 0 | Status: DISCONTINUED | OUTPATIENT
Start: 2018-10-04 | End: 2018-10-06

## 2018-10-04 RX ORDER — AER TRAVELER 0.5 G/1
1 SOLUTION RECTAL; TOPICAL EVERY 4 HOURS
Qty: 0 | Refills: 0 | Status: DISCONTINUED | OUTPATIENT
Start: 2018-10-04 | End: 2018-10-06

## 2018-10-04 RX ORDER — PRAMOXINE HYDROCHLORIDE 150 MG/15G
1 AEROSOL, FOAM RECTAL EVERY 4 HOURS
Qty: 0 | Refills: 0 | Status: DISCONTINUED | OUTPATIENT
Start: 2018-10-04 | End: 2018-10-06

## 2018-10-04 RX ORDER — IBUPROFEN 200 MG
600 TABLET ORAL EVERY 6 HOURS
Qty: 0 | Refills: 0 | Status: DISCONTINUED | OUTPATIENT
Start: 2018-10-04 | End: 2018-10-06

## 2018-10-04 RX ADMIN — Medication 125 MILLIUNIT(S)/MIN: at 21:20

## 2018-10-04 RX ADMIN — SODIUM CHLORIDE 2000 MILLILITER(S): 9 INJECTION, SOLUTION INTRAVENOUS at 18:27

## 2018-10-04 RX ADMIN — Medication 400 MILLIGRAM(S): at 20:00

## 2018-10-04 RX ADMIN — CARBOPROST TROMETHAMINE 250 MICROGRAM(S): 250 INJECTION, SOLUTION INTRAMUSCULAR at 19:37

## 2018-10-04 RX ADMIN — Medication 1000 MILLIUNIT(S)/MIN: at 20:00

## 2018-10-04 RX ADMIN — Medication 1000 MILLIGRAM(S): at 20:15

## 2018-10-04 RX ADMIN — SODIUM CHLORIDE 125 MILLILITER(S): 9 INJECTION, SOLUTION INTRAVENOUS at 18:30

## 2018-10-05 ENCOUNTER — TRANSCRIPTION ENCOUNTER (OUTPATIENT)
Age: 33
End: 2018-10-05

## 2018-10-05 LAB
BASOPHILS # BLD AUTO: 0 K/UL — SIGNIFICANT CHANGE UP (ref 0–0.2)
BASOPHILS NFR BLD AUTO: 0.1 % — SIGNIFICANT CHANGE UP (ref 0–2)
EOSINOPHIL # BLD AUTO: 0 K/UL — SIGNIFICANT CHANGE UP (ref 0–0.5)
EOSINOPHIL NFR BLD AUTO: 0.3 % — SIGNIFICANT CHANGE UP (ref 0–6)
FETAL SCREEN: SIGNIFICANT CHANGE UP
HCT VFR BLD CALC: 31.7 % — LOW (ref 37–47)
HGB BLD-MCNC: 10 G/DL — LOW (ref 12–16)
LYMPHOCYTES # BLD AUTO: 1.7 K/UL — SIGNIFICANT CHANGE UP (ref 1–4.8)
LYMPHOCYTES # BLD AUTO: 23 % — SIGNIFICANT CHANGE UP (ref 20–55)
MCHC RBC-ENTMCNC: 26.7 PG — LOW (ref 27–31)
MCHC RBC-ENTMCNC: 31.5 G/DL — LOW (ref 32–36)
MCV RBC AUTO: 84.5 FL — SIGNIFICANT CHANGE UP (ref 81–99)
MONOCYTES # BLD AUTO: 0.5 K/UL — SIGNIFICANT CHANGE UP (ref 0–0.8)
MONOCYTES NFR BLD AUTO: 6.9 % — SIGNIFICANT CHANGE UP (ref 3–10)
NEUTROPHILS # BLD AUTO: 5.3 K/UL — SIGNIFICANT CHANGE UP (ref 1.8–8)
NEUTROPHILS NFR BLD AUTO: 69.4 % — SIGNIFICANT CHANGE UP (ref 37–73)
PLATELET # BLD AUTO: 117 K/UL — LOW (ref 150–400)
RBC # BLD: 3.75 M/UL — LOW (ref 4.4–5.2)
RBC # FLD: 14.2 % — SIGNIFICANT CHANGE UP (ref 11–15.6)
T PALLIDUM AB TITR SER: NEGATIVE — SIGNIFICANT CHANGE UP
WBC # BLD: 7.6 K/UL — SIGNIFICANT CHANGE UP (ref 4.8–10.8)
WBC # FLD AUTO: 7.6 K/UL — SIGNIFICANT CHANGE UP (ref 4.8–10.8)

## 2018-10-05 RX ADMIN — OXYCODONE AND ACETAMINOPHEN 2 TABLET(S): 5; 325 TABLET ORAL at 21:00

## 2018-10-05 RX ADMIN — Medication 1 TABLET(S): at 18:33

## 2018-10-05 RX ADMIN — OXYCODONE AND ACETAMINOPHEN 2 TABLET(S): 5; 325 TABLET ORAL at 20:06

## 2018-10-05 NOTE — PROGRESS NOTE ADULT - SUBJECTIVE AND OBJECTIVE BOX
33 year old  s/p  at 41+0wks gestation PPD#1.   Patient seen and examined at bedside, no acute overnight events.   Patient is ambulating, eating, tolerating PO hydration, +voiding, +Flatus, -BM, formula feeding and pain is well controlled.  Denies headache, SOB, fever, chills and calf pain.     Vital Signs Last 24 Hrs  T(C): 36.6 (04 Oct 2018 22:40), Max: 36.8 (04 Oct 2018 20:00)  T(F): 97.8 (04 Oct 2018 22:40), Max: 98.2 (04 Oct 2018 20:00)  HR: 58 (04 Oct 2018 22:40) (52 - 67)  BP: 149/89 (04 Oct 2018 22:40) (121/84 - 149/89)  RR: 20 (04 Oct 2018 22:40) (16 - 20)  SpO2: 99% (04 Oct 2018 22:40) (99% - 99%)     Physical Exam:  General: NAD  CVS: RRR, +S1/S2  Lungs: normal vesicular breath sounds bilaterally, no wheeze, rhonchi or rales.   Abdomen: soft, ND, minimally tender, Fundus firm at level of umbilicus. Dressing is clean and dry. Staples in place.   Pelvic: Minimal lochia  Ext: No cyanosis, edema or calf tenderness.                                 12.0   8.3   )-----------( 122      ( 04 Oct 2018 20:49 )             35.0

## 2018-10-05 NOTE — DISCHARGE NOTE OB - PROVIDER TOKENS
FREE:[LAST:[[facility] OFFICE],PHONE:[phone]],FAX:[(   [phone]    -],ADDRESS:[[address] [place] [address]]]

## 2018-10-05 NOTE — DISCHARGE NOTE OB - PLAN OF CARE
delivery and recovery Patient should transition to regular activity level. Resume regular diet. Patient should follow up with her OB for a postpartum checkup within 6 weeks of discharge from the hospital. Patient should call her doctor sooner if she develops a fever or uncontrolled vaginal bleeding.

## 2018-10-05 NOTE — PROGRESS NOTE ADULT - ATTENDING COMMENTS
PPD#1  s/p cytotec and hemabate  doing well   meeting milestones   H/H pending for this AM  routine PP care

## 2018-10-05 NOTE — DISCHARGE NOTE OB - PATIENT PORTAL LINK FT
You can access the [facility] Patient Portal, offered by [facility], by registering with the following website: [email or web address]

## 2018-10-05 NOTE — PROGRESS NOTE ADULT - ASSESSMENT
33 year old  s/p  at 41+0wks gestation PPD#1. VSS.  Pt hemodynamically stable. Post partum labs reviewed and WNL.     Continue encouraging ambulation and PO intake. 33 year old  s/p  at 41+0wks gestation PPD#1. VSS.  Pt hemodynamically stable. Post partum labs reviewed and WNL. Patients blood pressure manually rechecked and found to be 121/86    Continue encouraging ambulation and PO intake.

## 2018-10-05 NOTE — DISCHARGE NOTE OB - CARE PLAN
Principal Discharge DX:	Vaginal delivery  Goal:	delivery and recovery  Assessment and plan of treatment:	Patient should transition to regular activity level. Resume regular diet. Patient should follow up with her OB for a postpartum checkup within 6 weeks of discharge from the hospital. Patient should call her doctor sooner if she develops a fever or uncontrolled vaginal bleeding.

## 2018-10-05 NOTE — DISCHARGE NOTE OB - MEDICATION SUMMARY - MEDICATIONS TO TAKE
I will START or STAY ON the medications listed below when I get home from the hospital:    ibuprofen 600 mg oral tablet  -- 1 tab(s) by mouth every 6 hours, As needed, Moderate Pain (4 - 6)  -- Indication: For pain control

## 2018-10-06 VITALS
TEMPERATURE: 98 F | HEART RATE: 64 BPM | DIASTOLIC BLOOD PRESSURE: 86 MMHG | SYSTOLIC BLOOD PRESSURE: 130 MMHG | RESPIRATION RATE: 18 BRPM

## 2018-10-06 PROCEDURE — 86780 TREPONEMA PALLIDUM: CPT

## 2018-10-06 PROCEDURE — 86901 BLOOD TYPING SEROLOGIC RH(D): CPT

## 2018-10-06 PROCEDURE — 59025 FETAL NON-STRESS TEST: CPT

## 2018-10-06 PROCEDURE — 84156 ASSAY OF PROTEIN URINE: CPT

## 2018-10-06 PROCEDURE — 86900 BLOOD TYPING SEROLOGIC ABO: CPT

## 2018-10-06 PROCEDURE — 80053 COMPREHEN METABOLIC PANEL: CPT

## 2018-10-06 PROCEDURE — 85461 HEMOGLOBIN FETAL: CPT

## 2018-10-06 PROCEDURE — 81001 URINALYSIS AUTO W/SCOPE: CPT

## 2018-10-06 PROCEDURE — 85027 COMPLETE CBC AUTOMATED: CPT

## 2018-10-06 PROCEDURE — 36415 COLL VENOUS BLD VENIPUNCTURE: CPT

## 2018-10-06 PROCEDURE — T1013: CPT

## 2018-10-06 PROCEDURE — 86870 RBC ANTIBODY IDENTIFICATION: CPT

## 2018-10-06 PROCEDURE — 86880 COOMBS TEST DIRECT: CPT

## 2018-10-06 PROCEDURE — 86850 RBC ANTIBODY SCREEN: CPT

## 2018-10-06 PROCEDURE — 82803 BLOOD GASES ANY COMBINATION: CPT

## 2018-10-06 PROCEDURE — G0463: CPT

## 2018-10-06 RX ORDER — IBUPROFEN 200 MG
1 TABLET ORAL
Qty: 28 | Refills: 0
Start: 2018-10-06 | End: 2018-10-12

## 2018-10-06 RX ADMIN — Medication 1 TABLET(S): at 12:04

## 2018-10-06 RX ADMIN — Medication 600 MILLIGRAM(S): at 12:05

## 2018-10-06 RX ADMIN — Medication 100 MILLIGRAM(S): at 12:04

## 2018-10-06 RX ADMIN — Medication 600 MILLIGRAM(S): at 13:05

## 2018-10-06 RX ADMIN — SIMETHICONE 80 MILLIGRAM(S): 80 TABLET, CHEWABLE ORAL at 12:04

## 2018-10-11 NOTE — CHART NOTE - NSCHARTNOTEFT_GEN_A_CORE
Patient is a 32 yo  s/p  on 10/04/2018. Blood type is A negative with a positive antibody screen showing Anti-D. Initial testing showing 4+ reactivity, but repeat manual tube testing showed a 2+ reactivity. 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 Rhogam can circulate and be detected for up to 4 months post administration.

## 2019-01-11 NOTE — DISCHARGE NOTE OB - HOSPITAL COURSE
Bladder non-tender and non-distended. Urine clear yellow.
34yo  s/p . Hospital course was uncomplicated. At the time of discharge, patient was tolerating a regular diet, voiding, flatus and ambulating without assistance.

## 2019-04-30 NOTE — PROGRESS NOTE ADULT - SUBJECTIVE AND OBJECTIVE BOX
----- Message from Herb Mckeon MD sent at 4/29/2019 10:50 PM CDT -----  Normal glucose, no diabetes  Normal chol.    Rec: as discussed during the visit   A/P 31y on PPD# 2 s/p , stable, PP precautions reviewed  ambulating, bottle feeding, lochia decreased, voiding  Vital Signs Last 24 Hrs  T(C): 36.6, Max: 36.7 (06-15 @ 08:04)  T(F): 97.9, Max: 98 (06-15 @ 08:04)  HR: 90 (72 - 90)  BP: 115/65 (109/77 - 115/65)  BP(mean): --  RR: 18 (18 - 18)  SpO2: --    PHYSICAL EXAM:    CHEST/LUNG: Clear to percussion bilaterally; No rales, rhonchi, wheezing, or rubs  HEART: Regular rate and rhythm; No murmurs, rubs, or gallops  BREASTS: deferred  ABDOMEN: Soft, Nontender, Nondistended; fundus is firm and Bowel sounds present  PERINEUM: Intact  and lochia minimal  EXTREMITIES:  2+ Peripheral Pulses, No clubbing, cyanosis, or edema    MEDICATIONS  (STANDING):  citric acid/sodium citrate Solution 30milliLiter(s) Oral once  oxytocin Infusion 2milliUNIT(s)/Min IV Continuous <Continuous>  sodium chloride 0.9% lock flush 3milliLiter(s) IV Push every 8 hours  diphtheria/tetanus/pertussis (acellular) Vaccine (ADAcel) 0.5milliLiter(s) IntraMuscular once  prenatal multivitamin 1Tablet(s) Oral daily  sodium chloride 0.9% lock flush 3milliLiter(s) IV Push every 8 hours  oxytocin Infusion 41.667milliUNIT(s)/Min IV Continuous <Continuous>    MEDICATIONS  (PRN):  acetaminophen   Tablet. 650milliGRAM(s) Oral every 6 hours PRN Mild Pain  acetaminophen   Tablet 650milliGRAM(s) Oral every 6 hours PRN For Temp greater than 38.5 C (101.3 F)  ibuprofen  Tablet 600milliGRAM(s) Oral every 6 hours PRN Moderate Pain  oxyCODONE  5 mG/acetaminophen 325 mG 2Tablet(s) Oral every 6 hours PRN Severe Pain  hydrocortisone 1% Cream 1Application(s) Topical every 4 hours PRN Moderate to Severe Perineal Pain  pramoxine 1%/zinc 5% Cream 1Application(s) Topical every 4 hours PRN Moderate to Severe Perineal Pain  dibucaine 1% Ointment 1Application(s) Topical every 4 hours PRN Perineal Discomfort  lanolin Ointment 1Application(s) Topical every 6 hours PRN Sore Nipples  witch hazel Pads 1Application(s) Topical every 4 hours PRN Perineal Discomfort  simethicone 80milliGRAM(s) Chew every 6 hours PRN Gas  diphenhydrAMINE   Capsule 25milliGRAM(s) Oral every 6 hours PRN Itching  glycerin Suppository - Adult 1Suppository(s) Rectal at bedtime PRN Constipation  magnesium hydroxide Suspension 30milliLiter(s) Oral two times a day PRN Constipation  docusate sodium 100milliGRAM(s) Oral two times a day PRN Stool Softening        LABS:                        11.0   15.8  )-----------( 166      ( 2017 13:06 )             32.2       1. Pain: well controlled on OPM  2. GI: Regular diet  3. : voiding  4. DVT prophylaxis: ambulate  5. Dispo:D/C home

## 2020-11-12 ENCOUNTER — OUTPATIENT (OUTPATIENT)
Dept: OUTPATIENT SERVICES | Facility: HOSPITAL | Age: 35
LOS: 1 days | End: 2020-11-12
Payer: COMMERCIAL

## 2020-11-12 VITALS
RESPIRATION RATE: 14 BRPM | SYSTOLIC BLOOD PRESSURE: 131 MMHG | DIASTOLIC BLOOD PRESSURE: 74 MMHG | OXYGEN SATURATION: 99 % | WEIGHT: 188.27 LBS | TEMPERATURE: 98 F | HEART RATE: 67 BPM | HEIGHT: 65 IN

## 2020-11-12 DIAGNOSIS — M25.532 PAIN IN LEFT WRIST: ICD-10-CM

## 2020-11-12 DIAGNOSIS — Z01.818 ENCOUNTER FOR OTHER PREPROCEDURAL EXAMINATION: ICD-10-CM

## 2020-11-12 DIAGNOSIS — M67.432 GANGLION, LEFT WRIST: ICD-10-CM

## 2020-11-12 DIAGNOSIS — S63.599A OTHER SPECIFIED SPRAIN OF UNSPECIFIED WRIST, INITIAL ENCOUNTER: ICD-10-CM

## 2020-11-12 NOTE — H&P PST ADULT - ASSESSMENT
this is a 34 y/o female who is scheduled for removal of cyst left wrist on 11/17/20 this is a 34 y/o female who is scheduled for removal of cyst left wrist on 11/17/20    CBC and ucg pending results

## 2020-11-12 NOTE — H&P PST ADULT - HISTORY OF PRESENT ILLNESS
this is a 34 y/o female who has had left wrist pain and a mass since injury 4/20, tests show a "ball" and broken bone left hand/wrist

## 2020-11-12 NOTE — H&P PST ADULT - NSICDXPROBLEM_GEN_ALL_CORE_FT
PROBLEM DIAGNOSES  Problem: Left wrist pain  Assessment and Plan: excision of dorsal wrist cyst, biopsy, steroid injection; covid appt given, preop instructions given; went for medical clearance, labwork; ekg done

## 2020-11-13 DIAGNOSIS — Z11.59 ENCOUNTER FOR SCREENING FOR OTHER VIRAL DISEASES: ICD-10-CM

## 2020-11-13 PROCEDURE — G0463: CPT

## 2020-11-15 ENCOUNTER — OUTPATIENT (OUTPATIENT)
Dept: OUTPATIENT SERVICES | Facility: HOSPITAL | Age: 35
LOS: 1 days | End: 2020-11-15
Payer: COMMERCIAL

## 2020-11-15 DIAGNOSIS — Z11.59 ENCOUNTER FOR SCREENING FOR OTHER VIRAL DISEASES: ICD-10-CM

## 2020-11-15 LAB — SARS-COV-2 RNA SPEC QL NAA+PROBE: SIGNIFICANT CHANGE UP

## 2020-11-15 PROCEDURE — U0003: CPT

## 2020-11-16 ENCOUNTER — TRANSCRIPTION ENCOUNTER (OUTPATIENT)
Age: 35
End: 2020-11-16

## 2020-11-17 ENCOUNTER — RESULT REVIEW (OUTPATIENT)
Age: 35
End: 2020-11-17

## 2020-11-17 ENCOUNTER — OUTPATIENT (OUTPATIENT)
Dept: OUTPATIENT SERVICES | Facility: HOSPITAL | Age: 35
LOS: 1 days | End: 2020-11-17
Payer: COMMERCIAL

## 2020-11-17 VITALS
WEIGHT: 188.27 LBS | HEART RATE: 67 BPM | OXYGEN SATURATION: 100 % | TEMPERATURE: 97 F | RESPIRATION RATE: 15 BRPM | SYSTOLIC BLOOD PRESSURE: 131 MMHG | DIASTOLIC BLOOD PRESSURE: 74 MMHG | HEIGHT: 64 IN

## 2020-11-17 VITALS
OXYGEN SATURATION: 100 % | SYSTOLIC BLOOD PRESSURE: 121 MMHG | DIASTOLIC BLOOD PRESSURE: 73 MMHG | HEART RATE: 90 BPM | RESPIRATION RATE: 17 BRPM

## 2020-11-17 DIAGNOSIS — M67.432 GANGLION, LEFT WRIST: ICD-10-CM

## 2020-11-17 DIAGNOSIS — S63.599A OTHER SPECIFIED SPRAIN OF UNSPECIFIED WRIST, INITIAL ENCOUNTER: ICD-10-CM

## 2020-11-17 LAB
HCG UR QL: NEGATIVE — SIGNIFICANT CHANGE UP
HCT VFR BLD CALC: 42.5 % — SIGNIFICANT CHANGE UP (ref 34.5–45)
HGB BLD-MCNC: 14.2 G/DL — SIGNIFICANT CHANGE UP (ref 11.5–15.5)
MCHC RBC-ENTMCNC: 28.5 PG — SIGNIFICANT CHANGE UP (ref 27–34)
MCHC RBC-ENTMCNC: 33.4 GM/DL — SIGNIFICANT CHANGE UP (ref 32–36)
MCV RBC AUTO: 85.2 FL — SIGNIFICANT CHANGE UP (ref 80–100)
NRBC # BLD: 0 /100 WBCS — SIGNIFICANT CHANGE UP (ref 0–0)
PLATELET # BLD AUTO: 225 K/UL — SIGNIFICANT CHANGE UP (ref 150–400)
RBC # BLD: 4.99 M/UL — SIGNIFICANT CHANGE UP (ref 3.8–5.2)
RBC # FLD: 11.9 % — SIGNIFICANT CHANGE UP (ref 10.3–14.5)
WBC # BLD: 6.3 K/UL — SIGNIFICANT CHANGE UP (ref 3.8–10.5)
WBC # FLD AUTO: 6.3 K/UL — SIGNIFICANT CHANGE UP (ref 3.8–10.5)

## 2020-11-17 PROCEDURE — 20550 NJX 1 TENDON SHEATH/LIGAMENT: CPT | Mod: LT

## 2020-11-17 PROCEDURE — 25111 REMOVE WRIST TENDON LESION: CPT | Mod: LT

## 2020-11-17 PROCEDURE — 36415 COLL VENOUS BLD VENIPUNCTURE: CPT

## 2020-11-17 PROCEDURE — 88305 TISSUE EXAM BY PATHOLOGIST: CPT

## 2020-11-17 PROCEDURE — 81025 URINE PREGNANCY TEST: CPT

## 2020-11-17 PROCEDURE — 85027 COMPLETE CBC AUTOMATED: CPT

## 2020-11-17 PROCEDURE — 88305 TISSUE EXAM BY PATHOLOGIST: CPT | Mod: 26

## 2020-11-17 RX ORDER — SODIUM CHLORIDE 9 MG/ML
1000 INJECTION, SOLUTION INTRAVENOUS
Refills: 0 | Status: DISCONTINUED | OUTPATIENT
Start: 2020-11-17 | End: 2020-11-17

## 2020-11-17 RX ORDER — CHLORHEXIDINE GLUCONATE 213 G/1000ML
1 SOLUTION TOPICAL ONCE
Refills: 0 | Status: COMPLETED | OUTPATIENT
Start: 2020-11-17 | End: 2020-11-17

## 2020-11-17 RX ORDER — APREPITANT 80 MG/1
40 CAPSULE ORAL ONCE
Refills: 0 | Status: COMPLETED | OUTPATIENT
Start: 2020-11-17 | End: 2020-11-17

## 2020-11-17 RX ORDER — HYDROMORPHONE HYDROCHLORIDE 2 MG/ML
0.5 INJECTION INTRAMUSCULAR; INTRAVENOUS; SUBCUTANEOUS
Refills: 0 | Status: DISCONTINUED | OUTPATIENT
Start: 2020-11-17 | End: 2020-11-17

## 2020-11-17 RX ORDER — OXYCODONE AND ACETAMINOPHEN 5; 325 MG/1; MG/1
1 TABLET ORAL
Qty: 20 | Refills: 0
Start: 2020-11-17

## 2020-11-17 RX ORDER — CEFAZOLIN SODIUM 1 G
2000 VIAL (EA) INJECTION ONCE
Refills: 0 | Status: COMPLETED | OUTPATIENT
Start: 2020-11-17 | End: 2020-11-17

## 2020-11-17 RX ADMIN — CHLORHEXIDINE GLUCONATE 1 APPLICATION(S): 213 SOLUTION TOPICAL at 10:41

## 2020-11-17 RX ADMIN — APREPITANT 40 MILLIGRAM(S): 80 CAPSULE ORAL at 10:41

## 2020-11-17 RX ADMIN — SODIUM CHLORIDE 75 MILLILITER(S): 9 INJECTION, SOLUTION INTRAVENOUS at 14:23

## 2020-11-17 NOTE — ASU PREOP CHECKLIST - VIA
Assessment    1  Encounter for preventive health examination (V70 0) (Z00 00)   2  Mixed hyperlipidemia (272 2) (E78 2)   3  History of Need for vaccination (V05 9) (Z23)   4  Need for vaccination (V05 9) (Z23)   5  BMI 34 0-34 9,adult (V85 34) (Z68 34)   6  Obesity, Class I, BMI 30-34 9 (278 00) (E66 9)    Plan  Health Maintenance    · DIGITAL RECTAL EXAM; Status:Complete;   Done: 70YRJ1073 08:47AM   · Hemoccult Screening - POC; Status:Complete;   Done: 40WIQ7519 08:46AM  Need for vaccination    · Fluzone Quadrivalent Intramuscular Suspension; 0 5ml; To Be Done:  [id number]  Obesity, Class I, BMI 30-34 9    · Avoid alcoholic beverages ; Status:Complete;   Done: 42HLJ9305   · Begin a limited exercise program ; Status:Complete;   Done: 23HVJ9642   · Begin or continue regular aerobic exercise  Gradually work up to at least 3 sessions of 30  minutes of exercise a week ; Status:Complete;   Done: 98VEU4998   · Eat a low fat and low cholesterol diet ; Status:Complete;   Done: 14JSM7864   · Keep a diary of when and what you eat ; Status:Complete;   Done: 38PYD1569   · Shared Decision Making Aid given; Status:Complete;   Done: 68LIQ8785   · Some eating tips that can help you lose weight ; Status:Complete;   Done: 22FLA6737   · Stretch and warm up your muscles during the first 10 minutes , then cool down your  muscles for the last 10 minutes of exercise ; Status:Complete;   Done: 46VEG7583   · There are many exercise options for seniors ; Status:Complete;   Done: 91EHF0989   · We encourage all of our patients to exercise regularly    30 minutes of exercise or physical  activity five or more days a week is recommended for children and adults ;  Status:Complete;   Done: 48DSN6063   · We recommend that you bring your body mass index down to 26 ; Status:Complete;    Done: 10PWR9865   · We recommend that you change your eating habits slowly ; Status:Complete;   Done:  34LWZ6140   · We recommend you modify your diet to achieve and maintain a healthy weight  Being  overweight may increase your risk for developing health problems such as diabetes,  heart disease, and cancer  Avoid high fat foods and eat a balanced diet rich  in fruits and vegetables  The combination of a reduced-calorie diet and increased  physical activity is recommended  Please let us know if you would like to  learn more about your nutrition and calorie needs, and additional options including  weight loss programs that can help you achieve your goals ; Status:Complete;   Done:  27FLR8104   · We want you to follow the Therapeutic Lifestyle Changes (TLC) diet ; Status:Complete;    Done: 01RIF8818   · Call [phone] if: You are considering suicide ; Status:Complete;   Done:  34VHS8870   · Call [phone] if: You are having difficulty sleeping (insomnia) ; Status:Complete;    Done: 61VEU4126   · Call [phone] if: You are urinating too frequently ; Status:Complete;   Done:  74DLL7659   · Call [phone] if: You feel thirsty most of the time ; Status:Complete;   Done:  32QYJ3366   · Call [phone] if: You feel your heart is beating very fast or skipping beats ;  Status:Complete;   Done: 27PJE1165   · Call [phone] if: You have feelings of extreme sadness and feelings of  hopelessness ; Status:Complete;   Done: 41TON9658   · Call [phone] if: You have pain in your abdomen ; Status:Complete;   Done:  75HOH6223   · Call [phone] if: You have symptoms of sleep apnea ; Status:Complete;   Done:  59WBA1321   · Call 911 if: You have sudden or severe chest pain with shortness of breath, rapid  breathing, or cough ; Status:Complete;   Done: 86CCG5533   · Seek Immediate Medical Attention if: You experience a new kind of chest pain (angina)  or pressure ; Status:Complete;   Done: 22DOD6558  Screening for prostate cancer    · (1) PSA (SCREEN) (Dx V76 44 Screen for Prostate Cancer); Status:Active;  Requested  for:14Nov2017;   Tonic-clonic epileptic seizures    · Phenytoin Sodium Extended 100 MG Oral Capsule; TAKE 3 CAPSULES  TWICE A DAY    Discussion/Summary    Pt does not see neurology  will get a psa labs for today were for a follow up not a physical in may  Chief Complaint  CPE      History of Present Illness  HM, Adult Male: The patient is being seen for a health maintenance evaluation  General Health:   Screening:   HPI: Pt is here for a full physical    pt states rtwo weeks ago at [employer] he feklt lightheaded and a friend had to help him out of hi sseat  no edy pain no sob with the episode  no palps  Has not happened since      Review of Systems    Constitutional: No fever or chills, feels well, no tiredness, no recent weight gain or weight loss  Eyes: No complaints of eye pain, no red eyes, no discharge from eyes, no itchy eyes  ENT: no complaints of earache, no hearing loss, no nosebleeds, no nasal discharge, no sore throat, no hoarseness  Cardiovascular: No complaints of slow heart rate, no fast heart rate, no chest pain, no palpitations, no leg claudication, no lower extremity  Respiratory: No complaints of shortness of breath, no wheezing, no cough, no SOB on exertion, no orthopnea or PND  Gastrointestinal: No complaints of abdominal pain, no constipation, no nausea or vomiting, no diarrhea or bloody stools  Genitourinary: No complaints of dysuria, no incontinence, no hesitancy, no nocturia, no genital lesion, no testicular pain  Musculoskeletal: No complaints of arthralgia, no myalgias, no joint swelling or stiffness, no limb pain or swelling  Integumentary: No complaints of skin rash or skin lesions, no itching, no skin wound, no dry skin  Neurological: No compliants of headache, no confusion, no convulsions, no numbness or tingling, no dizziness or fainting, no limb weakness, no difficulty walking     Psychiatric: Is not suicidal, no sleep disturbances, no anxiety or depression, no change in personality, no emotional problems  Endocrine: No complaints of proptosis, no hot flashes, no muscle weakness, no erectile dysfunction, no deepening of the voice, no feelings of weakness  Hematologic/Lymphatic: No complaints of swollen glands, no swollen glands in the neck, does not bleed easily, no easy bruising  Active Problems    1  BMI 34 0-34 9,adult (V85 34) (Z68 34)   2  Hemospermia (608 82) (R36 1)   3  History of colon polyps (V12 72) (Z86 010)   4  Malignant neoplasm of brain treated with radiation therapy (191 9) (C71 9)   5  Mixed hyperlipidemia (272 2) (E78 2)   6  Need for influenza vaccination (V04 81) (Z23)   7  Obesity, Class I, BMI 30-34 9 (278 00) (E66 9)   8  Other insomnia (780 52) (G47 09)   9  Screening for cardiovascular condition (V81 2) (Z13 6)   10  Screening for diabetes mellitus (V77 1) (Z13 1)   11  Screening for prostate cancer (V76 44) (Z12 5)   12  Tonic-clonic epileptic seizures (345 10) (G40 409)    Past Medical History    · History of Colon cancer screening (V76 51) (Z12 11)   · History of acute bronchitis (V12 69) (Z87 09)   · History of malignant neoplasm of brain (V10 85) (Z85 841)   · History of Need for vaccination (V05 9) (Z23)    Surgical History    · History of Colonoscopy (Fiberoptic) Splenic Flexure   · History of Craniotomy (Diagnostic)    Family History  Mother    · Family history of cerebrovascular accident (V17 1) (Z80 1)  Father    · Family history of    · Family history of Alzheimer's disease (V17 2) (Z82 0)    Social History    · Alcohol use   · DRINKS EVERY FRIDAY   · Never a smoker    Current Meds   1  Phenytoin Sodium Extended 100 MG Oral Capsule; TAKE 3 CAPSULES TWICE A DAY; Therapy: 90ZGI5547 to (Last Carlos A Ready)  Requested for: 92GUT5088 Ordered    Allergies    1   No Known Drug Allergies    Vitals   Recorded: 40YTO6327 08:18AM   Temperature 97 1 F   Heart Rate 72   Respiration 16   Systolic 547   Diastolic 76   Height 6 ft 1 in   Weight 253 lb    BMI Calculated 33 38   BSA Calculated 2 37     Physical Exam    Constitutional   General appearance: No acute distress, well appearing and well nourished  Head and Face   Head and face: Normal     Palpation of the face and sinuses: No sinus tenderness  Eyes   Conjunctiva and lids: No erythema, swelling or discharge  Pupils and irises: Equal, round, reactive to light  Ears, Nose, Mouth, and Throat   External inspection of ears and nose: Normal     Otoscopic examination: Tympanic membranes translucent with normal light reflex  Canals patent without erythema  Neck   Neck: Supple, symmetric, trachea midline, no masses  Pulmonary   Respiratory effort: No increased work of breathing or signs of respiratory distress  Cardiovascular   Palpation of heart: Normal PMI, no thrills  Auscultation of heart: Normal rate and rhythm, normal S1 and S2, no murmurs  Abdomen   Abdomen: Non-tender, no masses  Liver and spleen: No hepatomegaly or splenomegaly  Genitourinary   Scrotal contents: Normal testes, no masses  Penis: Normal, no lesions  Digital rectal exam of prostate: Normal size, no masses  Lymphatic   Palpation of lymph nodes in neck: No lymphadenopathy  Musculoskeletal   Gait and station: Normal     Inspection/palpation of digits and nails: Normal without clubbing or cyanosis  Skin   Skin and subcutaneous tissue: Normal without rashes or lesions  Neurologic   Cranial nerves: Cranial nerves 2-12 intact  Results/Data  PHQ-2 Adult Depression Screening [id number] 08:36AM Carolineed Neha     Test Name Result Flag Reference   PHQ-2 Adult Depression Score 0     Over the last two weeks, how often have you been bothered by any of the following problems?   Little interest or pleasure in doing things: Not at all - 0  Feeling down, depressed, or hopeless: Not at all - 0   PHQ-2 Adult Depression Screening Negative       (1) CBC/PLT/DIFF [id number] 09:29AM Jc Solomon     Test Name Result Flag Reference   WBC 5 0 x10E3/uL  3 4-10 8   RBC 4 56 x10E6/uL  4 14-5 80   Macrocytes present  Anisocytosis present  Hemoglobin 16 2 g/dL  12 6-17 7   Hematocrit 43 9 %  37 5-51 0   MCV 96 fL  79-97   MCH 35 5 pg H 26 6-33 0   MCHC 36 9 g/dL H 31 5-35 7   RDW 13 3 %  12 3-15 4   Platelets 339 O48G3/DC  150-379   Neutrophils 67 %     Lymphs 20 %     Monocytes 11 %     Eos 2 %     Basos 0 %     Neutrophils (Absolute) 3 3 x10E3/uL  1 4-7 0   Lymphs (Absolute) 1 0 x10E3/uL  0 7-3 1   Monocytes(Absolute) 0 6 x10E3/uL  0 1-0 9   Eos (Absolute) 0 1 x10E3/uL  0 0-0 4   Baso (Absolute) 0 0 x10E3/uL  0 0-0 2   Immature Granulocytes 0 %     Immature Grans (Abs) 0 0 x10E3/uL  0 0-0 1   Hematology Comments: Note:     Verified by microscopic examination       (1) [facility] [id number] 09:29AM [facility] Number     Test Name Result Flag Reference   Phenytoin (Dilantin), Serum 19 3 ug/mL  10 0-20 0   :                                                 Therapeutic 6 0 - 14 0                                                 Detection Limit =  0 8                                           <0 8 Indicates None Detected     (1) COMPREHENSIVE METABOLIC PANEL [id number] 81:07YF [facility] Number     Test Name Result Flag Reference   Glucose, Serum 87 mg/dL  65-99   BUN 16 mg/dL  8-27   Creatinine, Serum 0 79 mg/dL  0 76-1 27   BUN/Creatinine Ratio 20  10-24   Sodium, Serum 141 mmol/L  134-144   Potassium, Serum 4 9 mmol/L  3 5-5 2   Chloride, Serum 101 mmol/L     Carbon Dioxide, Total 25 mmol/L  18-29   Calcium, Serum 9 1 mg/dL  8 6-10 2   Protein, Total, Serum 6 5 g/dL  6 0-8 5   Albumin, Serum 4 5 g/dL  3 6-4 8   Globulin, Total 2 0 g/dL  1 5-4 5   A/G Ratio 2 3 H 1 2-2 2   Bilirubin, Total 0 2 mg/dL  0 0-1 2   Alkaline Phosphatase, S 131 IU/L H    AST (SGOT) 19 IU/L  0-40   ALT (SGPT) 25 IU/L  0-44   eGFR If NonAfricn Am 98 mL/min/1 73  >59   eGFR If Africn Am 113 mL/min/1 73  >59     (1) LIPID PANEL, FASTING [id number] 09:29AM Brien Altamirano     Test Name Result Flag Reference   Cholesterol, Total 193 mg/dL  100-199   Triglycerides 113 mg/dL  0-149   HDL Cholesterol 53 mg/dL  >39   VLDL Cholesterol [place] 23 mg/dL  5-40   LDL Cholesterol Calc 117 mg/dL H 0-99   T  Chol/HDL Ratio 3 6 ratio units  0 0-5 0   T  Chol/HDL Ratio                                                             Men  Women                                               1/2 Avg  Risk  3 4    3 3                                                   Avg Risk  5 0    4 4                                                2X Avg  Risk  9 6    7 1                                                3X Avg  Risk 23 4   11 0       Procedure    Procedure:   Results: 20/25 in both eyes with corrective device, 20/30 in the right eye with corrective device, 20/ in the left eye with corrective device      Signatures   Electronically signed by : Ariana Partida DO; Nov 14 2017  8:53AM EST                       (Author) stretcher

## 2020-11-17 NOTE — ASU DISCHARGE PLAN (ADULT/PEDIATRIC) - CALL YOUR DOCTOR IF YOU HAVE ANY OF THE FOLLOWING:
Swelling that gets worse/Fever greater than (need to indicate Fahrenheit or Celsius)/Bleeding that does not stop/Wound/Surgical Site with redness, or foul smelling discharge or pus/Pain not relieved by Medications/Numbness, tingling, color or temperature change to extremity

## 2020-11-17 NOTE — ASU DISCHARGE PLAN (ADULT/PEDIATRIC) - CARE PROVIDER_API CALL
Adis Claix  ORTHOPAEDIC SURGERY  [address] [place] [address]  Phone: [phone]  Fax: [phone]  Follow Up Time: 2 weeks

## 2020-11-17 NOTE — ASU DISCHARGE PLAN (ADULT/PEDIATRIC) - D. IF YOU HAD ANY TYPE OF SEDATION, YOU MAY EXPERIENCE LIGHTHEADEDNESS, DIZZINESS, OR SLEEPINESS FOLLOWING YOUR PROCEDURE. A RESPONSIBLE ADULT SHOULD STAY WITH YOU FOR AT LEAST 24 HOURS FOLLOWING YOUR PROCEDURE.
[FreeTextEntry1] : The patient has atypical chest pain. His EKG is normal.In view of his age this is noncardiac. He was referred to a gastroenterologist for his rectal bleeding. He will attempt to improve his diet. He does not require any health maintenance exams.The patient will contact me what the names of his other providers. He is not having any cognitive impairment. He has no significant current or past depression. He is fully functional and there are no safety issues. He will be screened for eye examination,and  Immunizations over the next 10 years. The patient was furnished with personalized health advice and does not need referral to health education or preventative counseling services. Patient does not need advanced care planning services.\par  Statement Selected

## 2020-11-17 NOTE — ASU DISCHARGE PLAN (ADULT/PEDIATRIC) - ASU DC SPECIAL INSTRUCTIONSFT
rest  ice  elevate  sling for comfort  non -weight bearing on operated extremity  wiggle fingers  Follow up primary care MD following discharge from facility  follow up Dr. Calix office call office for appt  Narcotic pain medications as needed .  Do not drive on pain medications

## 2020-11-17 NOTE — ASU PREOP CHECKLIST - STERILIZATION AFFIRMATION
Take tylenol on a scheduled basis and apply lactic cane patch as directed for pain relief.   Take tramadol for SEVERE pain every 6 hours as needed. Do not drive or operate heavy machine.    Follow up with sports medicine for further evaluation and management.  Follow up with PCP.    Future Appointments   Date Time Provider Department Center   9/12/2018  2:30 PM [facility] MAMMO3 DX [facility] MAMMO Ricco Bhandari   9/12/2018  3:30 PM Pilar Yanes MD [facility] BRSTSUR Ricco Bhandari       Our goal in the emergency department is to always give you outstanding care and exceptional service. You may receive a survey by mail or e-mail in the next week regarding your experience in our ED. We would greatly appreciate your completing and returning the survey. Your feedback provides us with a way to recognize our staff who give very good care and it helps us learn how to improve when your experience was below our aspiration of excellence.     
n/a

## 2021-01-19 NOTE — H&P PST ADULT - FUNCTIONAL SCREEN CURRENT LEVEL: BATHING, MLM
[address] [facility] PROGRESS NOTE      Patient  completed []  video visit   [x]   phone call:   7   Minutes :   to  review Medication Agreement    Location:  Provider:  working from    [x]    home    []   [facility] - LOURDES SIMMONS , patient at     Chief Complaint:  Headache     He c/o headache, all over,. He has long standing history of migraines dating back to when he was a teenager. He was ,previously been seen at [facility] in [place] had been on morphine and methadone in 2017 and had been on topamax.  He tried aimovig for 2 months which did not improve his headaches. Prior treatments included  Acupressure which was in [place] several years ( 15) years ago. Chiropractic treatment a few different Chiropractor visits, with neck adjustments x 15 years   He states sleeps intermittently. No Ed visits. Migraine   This is a chronic problem. The problem occurs constantly. The problem has been unchanged. Pain location: all over. The pain quality is similar to prior headaches. The quality of the pain is described as aching. The pain is at a severity of 6/10. The pain is moderate. Associated symptoms include back pain, phonophobia and photophobia. The symptoms are aggravated by noise. He has tried darkened room and oral narcotics (ice) for the symptoms.          Treatment goals:  Functional status: headaches to be manageable      Aberrancy:   Any alcoholic beverages            Any illegal drugs         Analgesia:       6              Adverse  Effects :makes tired      ADL;s :walking    Data:    When was thelast UDS:      2-17--2020       Was the UDS appropriate:Buprenorphine did not show up    Record/Diagnostics Review:      As above, I did review the imaging         2/21/2020 10:59 PM - Bull, Kash Incoming Lab Results From [facility]    Component Value Ref Range & Units Status Collected Lab Pain Management Drug Panel Interp, Urine Inconsistent   Final 02/17/2020  1:15 PM [facility]   (NOTE)   ________________________________________________________________   DRUGS EXPECTED:   NUCYNTA (TAPENTADOL) [2/17/20]   BUTRANS (BUPRENORPHINE) [REMOVED THIS AM]   ________________________________________________________________   CONSISTENT with medications provided:   NUCYNTA (TAPENTADOL) : based on tapentadol, tapentadol-o-sulfate   ________________________________________________________________   INCONSISTENT with medications provided:   BUTRANS (BUPRENORPHINE) : based on the absence of buprenorphine   and metabolites   ________________________________________________________________   INTERPRETIVE INFORMATION: Pain Mgt Abel, [place] Spec/EMIT, Ur,                            Interp   Interpretation depends on accuracy and completeness of patient   medication information submitted by client. Pill count: appropriate  fill date :1- nucynta and [id number] for butrans patch    Morphine equivalent dose as reported on [place]ARRS:60.12  Periodic Controlled Substance Monitoring: Possible medication side effects, risk of tolerance/dependence & alternative treatments discussed., No signs of potential drug abuse or diversion identified., Obtaining appropriate analgesic effect of treatment., Assessed functional status. Shimon Almanza, APRN - CNP)  Review ofOARRS does not show any aberrant prescription behavior. Medication is helping the patient stay active. Patient denies any side effects and reports adequate analgesia. No sign of misuse/abuse.             Past Medical History:   Diagnosis Date    Asthma     Depression     Headache(784.0)     Klinefelter syndrome        Past Surgical History:   Procedure Laterality Date    UPPP UVULOPALATOPHARYGOPLASTY         Allergies   Allergen Reactions    Food      Bananas, eggs, milk, cherries    Seasonal      Hay fever           Current Outpatient Medications:   divalproex (DEPAKOTE ER) 250 MG extended release tablet, TAKE ONE TABLET BY MOUTH TWICE DAILY, Disp: , Rfl:     tapentadol (NUCYNTA) 50 MG TABS, Take 1 tablet by mouth every 8 hours for 30 days. , Disp: 90 tablet, Rfl: 0    anastrozole (ARIMIDEX) 1 MG tablet, Take 1 mg by mouth daily takes 1/2 tab, Disp: , Rfl:     DULoxetine (CYMBALTA) 20 MG extended release capsule, TAKE 1 CAPSULE BY MOUTH ONCE A DAY, Disp: , Rfl: 2    loratadine-pseudoephedrine (CLARITIN-D 12 HOUR) 5-120 MG per extended release tablet, Take 1 tablet by mouth 2 times daily, Disp: , Rfl:     B-D 3CC LUER-BRANDI SYR 88RU5-6/2 22G X 1-1/2\" 3 ML MISC, USE AS DIRECTED WITH TESTOSTERONE, Disp: , Rfl:     testosterone cypionate (DEPOTESTOTERONE CYPIONATE) 200 MG/ML injection, INJECT 1.25 MILLILITER INTRAMUSCULARLY EVERY 2 WEEKS, Disp: , Rfl:     nadolol (CORGARD) 40 MG tablet, 1/4-1/2 AS DIRECTED FOR TREMOR, Disp: , Rfl: 5    VENTOLIN  (90 Base) MCG/ACT inhaler, TWO SPRAYS BY MOUTH FOUR TIMES A DAY AS NEEDED, Disp: , Rfl: 5    baclofen (LIORESAL) 10 MG tablet, Take 10 mg by mouth 3 times daily, Disp: , Rfl:     cyclobenzaprine (FLEXERIL) 10 MG tablet, , Disp: , Rfl:     Family History   Problem Relation Age of Onset    High Blood Pressure Father     Mental Illness Sister     High Blood Pressure Paternal Grandmother     Cancer Paternal Grandmother     Heart Disease Paternal Grandfather     High Blood Pressure Paternal Grandfather     Stroke Paternal Grandfather        Social History     Socioeconomic History    Marital status: Single     Spouse name: Not on file    Number of children: Not on file    Years of education: Not on file    Highest education level: Not on file   Occupational History    Occupation: disability   Social Needs    Financial resource strain: Not on file    Food insecurity     Worry: Not on file     Inability: Not on file    Transportation needs     Medical: Not on file     Non-medical: Not on file Relevant Medications    divalproex (DEPAKOTE ER) 250 MG extended release tablet    Encounter for long-term opiate analgesic use    Cervicalgia    Cervical spondylosis without myelopathy            Treatment Plan:  DISCUSSION: Treatment options discussed withpatient and all questions answered to patient's satisfaction. Possible side effects, risk of tolerance and or dependence and alternative treatments discussed    Obtaining appropriate analgesic effect of treatment   No signs of potential drug abuse or diversion identified    [x] Ill effects of being on chronic pain medications such as sleep disturbances, respiratory depression, hormonal changes, withdrawal symptoms, chronic opioid dependence and tolerance as well as risk of taking opioids with Benzodiazepines and taking opioids along with alcohol,  werediscussed with patient. I had asked the patient to minimize medication use and utilize pain medications only for uncontrolled rest pain or pain with exertional activities. I advised patient not to self-escalate painmedications without consulting with us. At each of patient's future visits we will try to taper pain medications, while adjusting the adjunct medications, and re-evaluating for Physical Therapy to improve spinal andjoint strength. We will continue to have discussions to decrease pain medications as tolerated. Counseled patient on effects their pain medication and /or their medical condition mayhave on their  ability to drive or operate machinery.  Instructed not to drive or operate machinery if drowsy I also discussed with the patient regarding the dangers of combining narcotic pain medication with tranquilizers, alcohol or illegal drugs or taking the medication any way other than prescribed. The dangers were discussed  including respiratory depression and death. Patient was told to tell  all  physicians regarding the medications he is getting from pain clinic. Patient is warned not to take any unprescribed medications over-the-countermedications that can depress breathing . Patient is advised to talk to the pharmacist or physicians if planning to take any over-the-counter medications before  takeing them. Patient is strongly advised to avoid tranquilizers or  relaxants, illegal drugs  or any medications that can depress breathing  Patient is also advised to tell us if there is any changes in their medications from other physicians.             TREATMENT OPTIONS:       Medication Agreement Requirements Met  Continue Opioid therapy  Script written for nucynta, butrans patch  Follow up appointment made 0 = independent

## 2021-07-28 NOTE — H&P PST ADULT - VASCULAR
"Chief Complaint   Patient presents with     Musculoskeletal Problem       Initial BP 98/66 (BP Location: Right arm, Patient Position: Sitting, Cuff Size: Adult Regular)   Pulse 80   Temp 97.6  F (36.4  C) (Tympanic)   Wt 59.4 kg (131 lb)   SpO2 100%   BMI 22.49 kg/m   Estimated body mass index is 22.49 kg/m  as calculated from the following:    Height as of 4/29/21: 1.626 m (5' 4\").    Weight as of this encounter: 59.4 kg (131 lb).  Medication Reconciliation: complete  Cris Peralta  " Equal and normal pulses (carotid, femoral, dorsalis pedis)

## 2021-10-19 NOTE — ASU PREOP CHECKLIST - TEMPERATURE IN FAHRENHEIT (DEGREES F)
Oriented to time, place, person, situation Oriented to time, place, person, situation Oriented to time, place, person, situation Oriented to time, place, person, situation Oriented to time, place, person, situation Oriented to time, place, person, situation Oriented to time, place, person, situation Oriented to time, place, person, situation Oriented to time, place, person, situation Oriented to time, place, person, situation Oriented to time, place, person, situation Oriented to time, place, person, situation Oriented to time, place, person, situation 97.3 Oriented to time, place, person, situation Oriented to time, place, person, situation Oriented to time, place, person, situation Oriented to time, place, person, situation Oriented to time, place, person, situation Oriented to time, place, person, situation Oriented to time, place, person, situation

## 2021-12-03 PROBLEM — M25.532 PAIN IN LEFT WRIST: Chronic | Status: ACTIVE | Noted: 2020-11-12

## 2021-12-15 ENCOUNTER — APPOINTMENT (OUTPATIENT)
Dept: NEUROLOGY | Facility: CLINIC | Age: 36
End: 2021-12-15
Payer: OTHER MISCELLANEOUS

## 2021-12-15 VITALS
DIASTOLIC BLOOD PRESSURE: 70 MMHG | HEIGHT: 65 IN | WEIGHT: 170 LBS | BODY MASS INDEX: 28.32 KG/M2 | SYSTOLIC BLOOD PRESSURE: 108 MMHG

## 2021-12-15 PROCEDURE — 99204 OFFICE O/P NEW MOD 45 MIN: CPT

## 2021-12-15 PROCEDURE — 99072 ADDL SUPL MATRL&STAF TM PHE: CPT

## 2022-01-05 ENCOUNTER — APPOINTMENT (OUTPATIENT)
Dept: NEUROLOGY | Facility: CLINIC | Age: 37
End: 2022-01-05
Payer: OTHER MISCELLANEOUS

## 2022-01-05 PROCEDURE — 95909 NRV CNDJ TST 5-6 STUDIES: CPT

## 2022-01-05 PROCEDURE — 95886 MUSC TEST DONE W/N TEST COMP: CPT

## 2022-01-05 PROCEDURE — 99072 ADDL SUPL MATRL&STAF TM PHE: CPT

## 2022-09-02 ENCOUNTER — APPOINTMENT (OUTPATIENT)
Dept: NEUROLOGY | Facility: CLINIC | Age: 37
End: 2022-09-02

## 2022-09-02 VITALS
DIASTOLIC BLOOD PRESSURE: 72 MMHG | WEIGHT: 170 LBS | BODY MASS INDEX: 28.32 KG/M2 | SYSTOLIC BLOOD PRESSURE: 110 MMHG | HEIGHT: 65 IN

## 2022-09-02 DIAGNOSIS — R20.2 PARESTHESIA OF SKIN: ICD-10-CM

## 2022-09-02 DIAGNOSIS — G62.9 POLYNEUROPATHY, UNSPECIFIED: ICD-10-CM

## 2022-09-02 PROCEDURE — 99214 OFFICE O/P EST MOD 30 MIN: CPT

## 2022-09-02 PROCEDURE — 99072 ADDL SUPL MATRL&STAF TM PHE: CPT

## 2022-09-02 NOTE — ASSESSMENT
[FreeTextEntry1] : Her examination is normal\par EMG and nerve conduction studies were normal\par There is no evidence for an underlying peripheral neuromuscular problem\par \par I suggested she return to her hand surgeon for further evaluation

## 2022-09-02 NOTE — PHYSICAL EXAM
[FreeTextEntry1] : There is full range of movement of the cervical spine.\par There is no cervical palpation tenderness.\par Tinel sign negative at the wrists and elbows.\par Phalen sign negative at the wrists.\par Adson's maneuver negative bilaterally.\par \par there is a small well-healed scar over the dorsum of the left wrist. [General Appearance - Alert] : alert [General Appearance - In No Acute Distress] : in no acute distress [General Appearance - Well-Appearing] : healthy appearing [Oriented To Time, Place, And Person] : oriented to person, place, and time [Impaired Insight] : insight and judgment were intact [Affect] : the affect was normal [Memory Recent] : recent memory was not impaired [Person] : oriented to person [Place] : oriented to place [Time] : oriented to time [Concentration Intact] : normal concentrating ability [Fluency] : fluency intact [Comprehension] : comprehension intact [Cranial Nerves Optic (II)] : visual acuity intact bilaterally,  visual fields full to confrontation, pupils equal round and reactive to light [Cranial Nerves Oculomotor (III)] : extraocular motion intact [Cranial Nerves Trigeminal (V)] : facial sensation intact symmetrically [Cranial Nerves Facial (VII)] : face symmetrical [Cranial Nerves Vestibulocochlear (VIII)] : hearing was intact bilaterally [Cranial Nerves Glossopharyngeal (IX)] : tongue and palate midline [Cranial Nerves Accessory (XI - Cranial And Spinal)] : head turning and shoulder shrug symmetric [Cranial Nerves Hypoglossal (XII)] : there was no tongue deviation with protrusion [Motor Tone] : muscle tone was normal in all four extremities [Motor Strength] : muscle strength was normal in all four extremities [No Muscle Atrophy] : normal bulk in all four extremities [Paresis Pronator Drift Right-Sided] : no pronator drift on the right [Paresis Pronator Drift Left-Sided] : no pronator drift on the left [Sensation Tactile Decrease] : light touch was intact [Sensation Pain / Temperature Decrease] : pain and temperature was intact [Proprioception] : proprioception was intact [Abnormal Walk] : normal gait [Balance] : balance was intact [Past-pointing] : there was no past-pointing [Tremor] : no tremor present [Coordination - Dysmetria Impaired Finger-to-Nose Bilateral] : not present [2+] : Ankle jerk left 2+ [Plantar Reflex Right Only] : normal on the right [Plantar Reflex Left Only] : normal on the left [PERRL With Normal Accommodation] : pupils were equal in size, round, reactive to light, with normal accommodation [Extraocular Movements] : extraocular movements were intact [Full Visual Field] : full visual field

## 2022-09-02 NOTE — HISTORY OF PRESENT ILLNESS
[FreeTextEntry1] : I saw her initially 12/15/2021 with the following history.  This right-handed woman reports that she injured her left hand at work on 4/21/20. She works scanning products. She hit her hand on a piece of metal. She said she developed swelling and a fracture. She eventually underwent surgery on 11/16/20. No records are available and it is unclear what procedure was done. She does point to a small scar on the dorsum of the left wrist. She developed numbness of the hand after surgery and this has persisted. She has had physical therapy which has not helped. She says digits 2 through 5 go numb frequently during the day also bother her at night when sleeping. She has no neck pain. She has no trouble with her right hand. She has returned to work and is currently working.\par \par Medical history otherwise unremarkable.\par \par EMG and nerve conduction studies were done on 1/5/2022.  These were normal\par \par She returns today, 9/10/2022.  Says she was sent here by her .  She reports ongoing pain and numbness in the left hand.  She notes it particularly when she is at work doing scanning.  She has no neck pain.

## 2022-09-02 NOTE — REASON FOR VISIT
[Follow-Up: _____] : a [unfilled] follow-up visit [[employer] ] : provided by [employer]   [TWNoteComboBox1] : [place]ian

## 2023-05-03 NOTE — DISCHARGE NOTE OB - CARE PROVIDER_API CALL
[facility] OFFICE,   [address] [place] [address]  Phone: [phone]  Fax: (       - Double Island Pedicle Flap Text: The defect edges were debeveled with a #15 scalpel blade. Given the location of the defect, shape of the defect and the proximity to free margins a double island pedicle advancement flap was deemed most appropriate. Using a sterile surgical marker, an appropriate advancement flap was drawn incorporating the defect, outlining the appropriate donor tissue and placing the expected incisions within the relaxed skin tension lines where possible. The area thus outlined was incised deep to adipose tissue with a #15 scalpel blade. The skin margins were undermined to an appropriate distance in all directions around the primary defect and laterally outward around the island pedicle utilizing iris scissors.  There was minimal undermining beneath the pedicle flap. Following this, the flap was carried over into the primary defect and sutured into place.

## 2023-08-02 ENCOUNTER — EMERGENCY (EMERGENCY)
Facility: HOSPITAL | Age: 38
LOS: 1 days | Discharge: HOME | End: 2023-08-02
Attending: EMERGENCY MEDICINE
Payer: MEDICAID

## 2023-08-02 VITALS
DIASTOLIC BLOOD PRESSURE: 85 MMHG | HEIGHT: 66 IN | RESPIRATION RATE: 18 BRPM | HEART RATE: 70 BPM | SYSTOLIC BLOOD PRESSURE: 135 MMHG | TEMPERATURE: 98 F | OXYGEN SATURATION: 98 % | WEIGHT: 184.53 LBS

## 2023-08-02 PROCEDURE — 99283 EMERGENCY DEPT VISIT LOW MDM: CPT

## 2023-08-02 PROCEDURE — 99284 EMERGENCY DEPT VISIT MOD MDM: CPT

## 2023-08-02 PROCEDURE — T1013: CPT

## 2023-08-02 RX ORDER — ACETAMINOPHEN 500 MG
650 TABLET ORAL ONCE
Refills: 0 | Status: COMPLETED | OUTPATIENT
Start: 2023-08-02 | End: 2023-08-02

## 2023-08-02 RX ORDER — DEXAMETHASONE 0.5 MG/5ML
8 ELIXIR ORAL ONCE
Refills: 0 | Status: DISCONTINUED | OUTPATIENT
Start: 2023-08-02 | End: 2023-08-02

## 2023-08-02 RX ORDER — HYDROCORTISONE 0.5 %
1 CREAM (GRAM) TOPICAL
Qty: 10 | Refills: 0
Start: 2023-08-02 | End: 2023-08-11

## 2023-08-02 RX ORDER — LIDOCAINE 4 G/100G
1 CREAM TOPICAL ONCE
Refills: 0 | Status: COMPLETED | OUTPATIENT
Start: 2023-08-02 | End: 2023-08-02

## 2023-08-02 RX ORDER — METHOCARBAMOL 500 MG/1
2 TABLET, FILM COATED ORAL
Qty: 12 | Refills: 0
Start: 2023-08-02 | End: 2023-08-04

## 2023-08-02 RX ORDER — IBUPROFEN 200 MG
600 TABLET ORAL ONCE
Refills: 0 | Status: COMPLETED | OUTPATIENT
Start: 2023-08-02 | End: 2023-08-02

## 2023-08-02 RX ORDER — METHOCARBAMOL 500 MG/1
750 TABLET, FILM COATED ORAL ONCE
Refills: 0 | Status: COMPLETED | OUTPATIENT
Start: 2023-08-02 | End: 2023-08-02

## 2023-08-02 RX ORDER — ACETAMINOPHEN 500 MG
2 TABLET ORAL
Qty: 30 | Refills: 0
Start: 2023-08-02 | End: 2023-08-06

## 2023-08-02 RX ORDER — DEXAMETHASONE 0.5 MG/5ML
8 ELIXIR ORAL ONCE
Refills: 0 | Status: COMPLETED | OUTPATIENT
Start: 2023-08-02 | End: 2023-08-02

## 2023-08-02 RX ORDER — IBUPROFEN 200 MG
1 TABLET ORAL
Qty: 15 | Refills: 0
Start: 2023-08-02 | End: 2023-08-06

## 2023-08-02 RX ADMIN — Medication 600 MILLIGRAM(S): at 13:45

## 2023-08-02 RX ADMIN — Medication 650 MILLIGRAM(S): at 13:45

## 2023-08-02 RX ADMIN — Medication 8 MILLIGRAM(S): at 13:45

## 2023-08-02 RX ADMIN — LIDOCAINE 1 PATCH: 4 CREAM TOPICAL at 13:45

## 2023-08-02 RX ADMIN — METHOCARBAMOL 750 MILLIGRAM(S): 500 TABLET, FILM COATED ORAL at 13:45

## 2023-08-02 NOTE — ED ADULT NURSE NOTE - OBJECTIVE STATEMENT
pt presents to ED in NAD c/o lower back pain since this morning. pt states she was at work and she bent down to  a box (~8lbs) and hurt her back. pt states 10/10 pain. pt denies N/V, chest pain, SOB, weakness, dizziness, and falls. pt denies blood thinners. pt breathing even and unlabored at this time.

## 2023-08-02 NOTE — ED PROVIDER NOTE - PHYSICAL EXAMINATION
Gen: Well appearing in NAD  Head: NC/AT  Neck: trachea midline  Resp:  No distress  Ext: no deformities  spine no midline pt vertebral or step offs. + right lumbosacral paraspinal muscle tenderness. ambulatory stable gait. 5/5 motor and sensation intact to b/l LEs   Neuro:  A&O appears non focal deficits.   Skin:  Warm and dry as visualized  Psych:  Normal affect and mood

## 2023-08-02 NOTE — ED PROVIDER NOTE - OBJECTIVE STATEMENT
36 yo female no reported health issues presenting to the ED with acute onset of lower back pain after lifting a heavy object from below the waist. reports that as lifting the object up she felt sharp pain to the right lower back. no previous back issues. pain worse with movements. no medication given or taken for symptomatic relief. lmp 2 weeks ago, denies chance of pregnancy.

## 2023-08-02 NOTE — ED PROVIDER NOTE - NS ED ATTENDING STATEMENT MOD
This was a shared visit with the LAURENT. I reviewed and verified the documentation and independently performed the documented:

## 2023-08-02 NOTE — ED PROVIDER NOTE - ATTENDING APP SHARED VISIT CONTRIBUTION OF CARE
Vinny VILCHIS 37-year-old female presents with low back pain started this morning when at her work she lifted something heavy and strained her back.  Patient denies any difficulty urinating or defecating.  Patient denies pregnancy.  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
change

## 2023-08-02 NOTE — ED PROVIDER NOTE - NSFOLLOWUPINSTRUCTIONS_ED_ALL_ED_FT
técnicas de levantamiento adecuadas  tome los medicamentos recetados según las indicaciones, no tome relajantes musculares si conduce u opera maquinaria, causará sedación.  siga con el médico de atención primaria y con el especialista en columna vertebral    Dolor de espalda    El dolor de espalda es muy común en los adultos. La causa del dolor de espalda lillian vez es peligrosa y el dolor a menudo mejora con el tiempo. Es posible que no se conozca la causa de kraus dolor de espalda y puede incluir distensión de los músculos o ligamentos, degeneración de los discos de la columna vertebral o artritis. Ocasionalmente, el dolor puede irradiarse hacia la(s) pierna(s). Los medicamentos de venta mackenzie para reducir el dolor y la inflamación suelen ser los más útiles. Estirarse y permanecer activo con frecuencia ayuda al proceso de curación.    BUSQUE ATENCIÓN MÉDICA INMEDIATA SI TIENE ALGUNO DE LOS SIGUIENTES SÍNTOMAS: problemas de control de los intestinos o la vejiga, debilidad inusual o entumecimiento en los brazos o las piernas, náuseas o vómitos, dolor abdominal, fiebre, mareos/aturdimiento.

## 2023-08-02 NOTE — ED PROVIDER NOTE - CLINICAL SUMMARY MEDICAL DECISION MAKING FREE TEXT BOX
female nontoxic appearing stable vitals presenting to the ED with lower back pain on right side after lifting object from below the waist. no midline tenderness. + paraspinal muscle tenderness. will treat pain fu with spine and return precautions

## 2023-08-02 NOTE — ED PROVIDER NOTE - PATIENT PORTAL LINK FT
You can access the FollowMyHealth Patient Portal offered by [facility] by registering at the following website: [email or web address]. By joining [employer]’s FollowMyHealth portal, you will also be able to view your health information using other applications (apps) compatible with our system.

## 2024-01-31 NOTE — ASU PREOP CHECKLIST - DNR CLARIFICATION FORM COMPLETED
n/a
impairments found/functional limitations in following categories/risk reduction/prevention/rehab potential/therapy frequency/predicted duration of therapy intervention/anticipated equipment needs at discharge/anticipated discharge recommendation

## 2025-05-20 NOTE — BRIEF OPERATIVE NOTE - TYPE OF ANESTHESIA
----- Message from Chuck Bernal MD sent at 5/20/2025  8:28 AM CDT -----  Nurses, could you pls check if TSH is still pending?    [facility]

## 2025-07-18 NOTE — ED ADULT NURSE NOTE - CAS TRG GENERAL NORM CIRC DET
[FreeTextEntry1] : - 7/18/25: 68 y/o F presents with hearing loss over the past 6 months. On exam there is evidence of left cerumen impaction. This was cleaned today without issue. Patient noted immediate improvement back to baseline. At this time I am recommending  follow-up as needed. Based on audiogram follow up for further management.    - Follow-up as needed  Strong peripheral pulses